# Patient Record
Sex: MALE | Race: WHITE | NOT HISPANIC OR LATINO | URBAN - METROPOLITAN AREA
[De-identification: names, ages, dates, MRNs, and addresses within clinical notes are randomized per-mention and may not be internally consistent; named-entity substitution may affect disease eponyms.]

---

## 2017-10-19 ENCOUNTER — OUTPATIENT (OUTPATIENT)
Dept: OUTPATIENT SERVICES | Facility: HOSPITAL | Age: 47
LOS: 1 days | End: 2017-10-19
Payer: COMMERCIAL

## 2017-10-19 ENCOUNTER — APPOINTMENT (OUTPATIENT)
Dept: MRI IMAGING | Facility: HOSPITAL | Age: 47
End: 2017-10-19
Payer: COMMERCIAL

## 2017-10-19 DIAGNOSIS — M51.26 OTHER INTERVERTEBRAL DISC DISPLACEMENT, LUMBAR REGION: ICD-10-CM

## 2017-10-19 DIAGNOSIS — M43.17 SPONDYLOLISTHESIS, LUMBOSACRAL REGION: ICD-10-CM

## 2017-10-19 DIAGNOSIS — Z00.8 ENCOUNTER FOR OTHER GENERAL EXAMINATION: ICD-10-CM

## 2017-10-19 PROCEDURE — 72148 MRI LUMBAR SPINE W/O DYE: CPT

## 2017-10-19 PROCEDURE — 72148 MRI LUMBAR SPINE W/O DYE: CPT | Mod: 26

## 2017-10-23 ENCOUNTER — APPOINTMENT (OUTPATIENT)
Dept: ORTHOPEDIC SURGERY | Facility: CLINIC | Age: 47
End: 2017-10-23
Payer: COMMERCIAL

## 2017-10-23 VITALS — BODY MASS INDEX: 26.88 KG/M2 | WEIGHT: 192 LBS | HEIGHT: 71 IN

## 2017-10-23 VITALS — HEART RATE: 63 BPM | DIASTOLIC BLOOD PRESSURE: 101 MMHG | SYSTOLIC BLOOD PRESSURE: 136 MMHG

## 2017-10-23 DIAGNOSIS — M48.07 SPINAL STENOSIS, LUMBOSACRAL REGION: ICD-10-CM

## 2017-10-23 PROCEDURE — 99203 OFFICE O/P NEW LOW 30 MIN: CPT

## 2018-01-03 ENCOUNTER — APPOINTMENT (OUTPATIENT)
Dept: ORTHOPEDIC SURGERY | Facility: CLINIC | Age: 48
End: 2018-01-03

## 2018-07-23 ENCOUNTER — TRANSCRIPTION ENCOUNTER (OUTPATIENT)
Age: 48
End: 2018-07-23

## 2018-07-24 ENCOUNTER — EMERGENCY (EMERGENCY)
Facility: HOSPITAL | Age: 48
LOS: 1 days | Discharge: HOME | End: 2018-07-24
Attending: EMERGENCY MEDICINE | Admitting: EMERGENCY MEDICINE
Payer: COMMERCIAL

## 2018-07-24 VITALS
OXYGEN SATURATION: 99 % | HEART RATE: 96 BPM | HEIGHT: 71 IN | RESPIRATION RATE: 18 BRPM | SYSTOLIC BLOOD PRESSURE: 157 MMHG | WEIGHT: 192.02 LBS | DIASTOLIC BLOOD PRESSURE: 100 MMHG | TEMPERATURE: 98 F

## 2018-07-24 VITALS
OXYGEN SATURATION: 99 % | DIASTOLIC BLOOD PRESSURE: 67 MMHG | RESPIRATION RATE: 16 BRPM | SYSTOLIC BLOOD PRESSURE: 130 MMHG | HEART RATE: 80 BPM

## 2018-07-24 PROCEDURE — 14040 TIS TRNFR F/C/C/M/N/A/G/H/F: CPT

## 2018-07-24 PROCEDURE — 99285 EMERGENCY DEPT VISIT HI MDM: CPT | Mod: 25

## 2018-07-24 PROCEDURE — 73140 X-RAY EXAM OF FINGER(S): CPT | Mod: 26,LT

## 2018-07-24 PROCEDURE — 73140 X-RAY EXAM OF FINGER(S): CPT

## 2018-07-24 PROCEDURE — 90471 IMMUNIZATION ADMIN: CPT

## 2018-07-24 PROCEDURE — 73140 X-RAY EXAM OF FINGER(S): CPT | Mod: 26,77,LT

## 2018-07-24 PROCEDURE — 99283 EMERGENCY DEPT VISIT LOW MDM: CPT

## 2018-07-24 PROCEDURE — 11740 EVACUATION SUBUNGUAL HMTMA: CPT | Mod: T3

## 2018-07-24 PROCEDURE — 90715 TDAP VACCINE 7 YRS/> IM: CPT

## 2018-07-24 RX ORDER — TETANUS TOXOID, REDUCED DIPHTHERIA TOXOID AND ACELLULAR PERTUSSIS VACCINE, ADSORBED 5; 2.5; 8; 8; 2.5 [IU]/.5ML; [IU]/.5ML; UG/.5ML; UG/.5ML; UG/.5ML
0.5 SUSPENSION INTRAMUSCULAR ONCE
Qty: 0 | Refills: 0 | Status: COMPLETED | OUTPATIENT
Start: 2018-07-24 | End: 2018-07-24

## 2018-07-24 RX ORDER — MORPHINE SULFATE 50 MG/1
4 CAPSULE, EXTENDED RELEASE ORAL ONCE
Qty: 0 | Refills: 0 | Status: DISCONTINUED | OUTPATIENT
Start: 2018-07-24 | End: 2018-07-24

## 2018-07-24 RX ORDER — OXYCODONE AND ACETAMINOPHEN 5; 325 MG/1; MG/1
1 TABLET ORAL ONCE
Qty: 0 | Refills: 0 | Status: DISCONTINUED | OUTPATIENT
Start: 2018-07-24 | End: 2018-07-24

## 2018-07-24 RX ADMIN — OXYCODONE AND ACETAMINOPHEN 1 TABLET(S): 5; 325 TABLET ORAL at 17:49

## 2018-07-24 RX ADMIN — Medication 1 TABLET(S): at 20:32

## 2018-07-24 RX ADMIN — OXYCODONE AND ACETAMINOPHEN 1 TABLET(S): 5; 325 TABLET ORAL at 21:33

## 2018-07-24 RX ADMIN — TETANUS TOXOID, REDUCED DIPHTHERIA TOXOID AND ACELLULAR PERTUSSIS VACCINE, ADSORBED 0.5 MILLILITER(S): 5; 2.5; 8; 8; 2.5 SUSPENSION INTRAMUSCULAR at 17:49

## 2018-07-24 RX ADMIN — OXYCODONE AND ACETAMINOPHEN 1 TABLET(S): 5; 325 TABLET ORAL at 19:00

## 2018-07-24 NOTE — ED PROVIDER NOTE - PHYSICAL EXAMINATION
left hand- 4th finger- crush injury, decrease ROM due to pain, positive radial pulse, less than 2 sec cap refill, nail exposed left hand- 4th finger- crush injury, decrease ROM due to pain, positive radial pulse, less than 2 sec cap refill, nail exposed  Nail deformity with avulsion of eponychial fold, subungal hematoma, ulnar and radial paronychial lacerations deep to subcutaneous layer with necrotic tissue.  1.2cm laceration of ulnar distal phalanx deep to subcutaneous layer with bleeding. Soft compartments.

## 2018-07-24 NOTE — ED PROVIDER NOTE - ATTENDING CONTRIBUTION TO CARE
Pt seen and examined with Gosia Reese and agree with H and P and Plan in its entirety without exeption  caught finger in deer stand while practicing for hunting season when stand started falling; left ring finger got caught between tree and stand causing crush injury  plan  plastics to see  tetanus  antibiotics=augmentin  reassess  dispo with followup

## 2018-07-24 NOTE — CONSULT NOTE ADULT - ASSESSMENT
A/P:48 y/o with nail avulsion and open fracture s/p repair.   - LUE elevation  - Pain control  - Augmentin  - Tetanus  - Maintain splint  - F/U 2 days  - Patient educated on warning signs to prompt ER return and need for possible pin fixation of fracture.     Thank You  David Benito MD  Plastic Surgery  [phone]

## 2018-07-24 NOTE — ED ADULT NURSE NOTE - OBJECTIVE STATEMENT
Pt presents with injury to fourth digit og left hand; states fell out of tree while installing hunting tree stand.

## 2018-07-24 NOTE — ED PROVIDER NOTE - OBJECTIVE STATEMENT
47 yr old male with no pmhx presents c/o left ring finger laceration, crush injury. 47 yr old male with no pmhx presents c/o left ring finger laceration, crush injury. Pt states finger stuck in deer stand right before arrival. Unknown last tetanus. Right hand dominant.

## 2018-07-24 NOTE — ED PROVIDER NOTE - PROGRESS NOTE DETAILS
hand called for consult. xray showing fracture.  finger reduced. Pt to follow up with hand surgeon- Dr. Benito as directed. Pt will be discharged with augmentin and percocet. Pt agrees with plan.

## 2018-07-24 NOTE — ED PROVIDER NOTE - MEDICAL DECISION MAKING DETAILS
47 yr old male with no pmhx presents c/o left ring finger laceration, crush injury. Pt states finger stuck in deer stand right before arrival. Unknown last tetanus. Right hand dominant.: r/o open finger fracture, pain control, xray, hand consult

## 2018-07-24 NOTE — CONSULT NOTE ADULT - SUBJECTIVE AND OBJECTIVE BOX
ODILON CONNORS  [id number]  [facility]    47yMale, RHD, presents with avulsion to the left ring finger after having fingers crushed in a tree device while hunting.  Patient reports bleeding and pain.  Patient denies weakness or numbness in the hand.  Patient denies other injuries.    PMHx/PSHx:  No pertinent past medical history  No significant past surgical history    amoxicillin  875 milliGRAM(s)/clavulanate 1 Tablet(s) Oral Once      Avelox (Unknown)      T(C): 36.4 (07-24-18 @ 16:37), Max: 36.4 (07-24-18 @ 16:37)  HR: 96 (07-24-18 @ 16:37) (96 - 96)  BP: 157/100 (07-24-18 @ 16:37) (157/100 - 157/100)  RR: 18 (07-24-18 @ 16:37) (18 - 18)  SpO2: 99% (07-24-18 @ 16:37) (99% - 99%)  NAD  Left Ring Finger:  Nail deformity with avulsion of eponychial fold, subungal hematoma, ulnar and radial paronychial lacerations deep to subcutaneous layer with necrotic tissue.  1.2cm laceration of ulnar distal phalanx deep to subcutaneous layer with bleeding.  +FDS/+FDP/+Extension in DIP/PIP/MCP joints in the digit.  Cap refill <3s.  +UDN/+RDN in all digits.  Soft compartments.      Xray:   PROCEDURE DATE:  07/24/2018        INTERPRETATION:      Radiographs of the left fourth finger         CLINICAL INFORMATION:  Crush injury    TECHNIQUE:  Frontal, oblique and lateral views of the finger were   obtained.    FINDINGS:   No prior examinations are available for review.    The phalanges are remarkable for a comminuted displaced fracture of the   tuft of the fourth finger. Distal tuft is displaced laterally and   volarly. The metacarpals are intact. There is no malalignment. . No   radiopaque foreign body is appreciated.     IMPRESSION: Displaced comminuted fracture of the tuft of the fourth   finger of the left hand      Procedure:  Left ring finger digital block / dorsal radial block.  Washout of wound with betadine.  Removal of nail plate, evacuation of subungal hematoma.  Excisional debridement of matrix to bone.  Matrix undermined and repaired with 5.0 vicryl.  Excisional debridement of eponychial fold skin to dermis.  Excisional debridement skin to subcutaneous layer of ulnar and radial paronychial lacerations.  Skin flaps undermined, advanced, repaired with 5.0 nylon.  Washout of volar wound.  Excisional debridement skin to subcutaneous layer.  Dissection and ligation of ulnar neurovascular bundle.  Skin flaps undermined, advanced, repaired with 5.0 nylon.  Nail plate sutured with 5.0 nylon suture as eponychial stent. Antibotic dressing applied with splint.

## 2018-07-26 ENCOUNTER — OUTPATIENT (OUTPATIENT)
Dept: OUTPATIENT SERVICES | Facility: HOSPITAL | Age: 48
LOS: 1 days | End: 2018-07-26
Payer: COMMERCIAL

## 2018-07-26 VITALS
RESPIRATION RATE: 16 BRPM | WEIGHT: 190.04 LBS | HEART RATE: 60 BPM | TEMPERATURE: 98 F | DIASTOLIC BLOOD PRESSURE: 89 MMHG | HEIGHT: 71 IN | SYSTOLIC BLOOD PRESSURE: 122 MMHG

## 2018-07-26 DIAGNOSIS — S62.635A DISPLACED FRACTURE OF DISTAL PHALANX OF LEFT RING FINGER, INITIAL ENCOUNTER FOR CLOSED FRACTURE: ICD-10-CM

## 2018-07-26 DIAGNOSIS — Z98.890 OTHER SPECIFIED POSTPROCEDURAL STATES: Chronic | ICD-10-CM

## 2018-07-26 DIAGNOSIS — S67.195A CRUSHING INJURY OF LEFT RING FINGER, INITIAL ENCOUNTER: ICD-10-CM

## 2018-07-26 DIAGNOSIS — Z01.818 ENCOUNTER FOR OTHER PREPROCEDURAL EXAMINATION: ICD-10-CM

## 2018-07-26 LAB
HCT VFR BLD CALC: 49.8 % — SIGNIFICANT CHANGE UP (ref 39–50)
HGB BLD-MCNC: 16.6 G/DL — SIGNIFICANT CHANGE UP (ref 13–17)
MCHC RBC-ENTMCNC: 29.7 PG — SIGNIFICANT CHANGE UP (ref 27–34)
MCHC RBC-ENTMCNC: 33.3 GM/DL — SIGNIFICANT CHANGE UP (ref 32–36)
MCV RBC AUTO: 89.1 FL — SIGNIFICANT CHANGE UP (ref 80–100)
NRBC # BLD: 0 /100 WBCS — SIGNIFICANT CHANGE UP (ref 0–0)
PLATELET # BLD AUTO: 221 K/UL — SIGNIFICANT CHANGE UP (ref 150–400)
RBC # BLD: 5.59 M/UL — SIGNIFICANT CHANGE UP (ref 4.2–5.8)
RBC # FLD: 12.8 % — SIGNIFICANT CHANGE UP (ref 10.3–14.5)
WBC # BLD: 7.37 K/UL — SIGNIFICANT CHANGE UP (ref 3.8–10.5)
WBC # FLD AUTO: 7.37 K/UL — SIGNIFICANT CHANGE UP (ref 3.8–10.5)

## 2018-07-26 PROCEDURE — G0463: CPT

## 2018-07-26 PROCEDURE — 85027 COMPLETE CBC AUTOMATED: CPT

## 2018-07-26 NOTE — H&P PST ADULT - HISTORY OF PRESENT ILLNESS
48 y/o healthy male with c/o of injury to left hand while practicing hunting deer. It happened on 7/24/18. Came to the ER in [facility], xray done, has fracture of the tip of the ring finger on left hand. Sutured the skin, however need surgery to repair the bone. Today in PST for pre op testing.

## 2018-07-26 NOTE — H&P PST ADULT - ASSESSMENT
46 y/o healthy male with c/o of injury to left hand while practicing hunting deer. It happened on 7/24/18. Came to the ER in [facility], xray done, has fracture of the tip of the ring finger on left hand. Sutured the skin, however need surgery to repair the bone. Today in PST for pre op testing.

## 2018-07-26 NOTE — H&P PST ADULT - NSANTHOSAYNRD_GEN_A_CORE
No. SELENE screening performed.  STOP BANG Legend: 0-2 = LOW Risk; 3-4 = INTERMEDIATE Risk; 5-8 = HIGH Risk

## 2018-07-31 ENCOUNTER — TRANSCRIPTION ENCOUNTER (OUTPATIENT)
Age: 48
End: 2018-07-31

## 2018-08-01 ENCOUNTER — OUTPATIENT (OUTPATIENT)
Dept: OUTPATIENT SERVICES | Facility: HOSPITAL | Age: 48
LOS: 1 days | End: 2018-08-01
Payer: COMMERCIAL

## 2018-08-01 VITALS
HEIGHT: 71 IN | OXYGEN SATURATION: 99 % | HEART RATE: 62 BPM | SYSTOLIC BLOOD PRESSURE: 135 MMHG | RESPIRATION RATE: 14 BRPM | TEMPERATURE: 98 F | WEIGHT: 192.02 LBS | DIASTOLIC BLOOD PRESSURE: 91 MMHG

## 2018-08-01 VITALS
DIASTOLIC BLOOD PRESSURE: 88 MMHG | SYSTOLIC BLOOD PRESSURE: 132 MMHG | HEART RATE: 52 BPM | OXYGEN SATURATION: 100 % | RESPIRATION RATE: 14 BRPM

## 2018-08-01 DIAGNOSIS — S62.635A DISPLACED FRACTURE OF DISTAL PHALANX OF LEFT RING FINGER, INITIAL ENCOUNTER FOR CLOSED FRACTURE: ICD-10-CM

## 2018-08-01 DIAGNOSIS — Z98.890 OTHER SPECIFIED POSTPROCEDURAL STATES: Chronic | ICD-10-CM

## 2018-08-01 DIAGNOSIS — S67.195A CRUSHING INJURY OF LEFT RING FINGER, INITIAL ENCOUNTER: ICD-10-CM

## 2018-08-01 PROCEDURE — 26756 PIN FINGER FRACTURE EACH: CPT | Mod: F3

## 2018-08-01 PROCEDURE — C1713: CPT

## 2018-08-01 PROCEDURE — 76000 FLUOROSCOPY <1 HR PHYS/QHP: CPT

## 2018-08-01 RX ORDER — HYDROMORPHONE HYDROCHLORIDE 2 MG/ML
0.5 INJECTION INTRAMUSCULAR; INTRAVENOUS; SUBCUTANEOUS
Qty: 0 | Refills: 0 | Status: DISCONTINUED | OUTPATIENT
Start: 2018-08-01 | End: 2018-08-01

## 2018-08-01 RX ORDER — ONDANSETRON 8 MG/1
4 TABLET, FILM COATED ORAL ONCE
Qty: 0 | Refills: 0 | Status: DISCONTINUED | OUTPATIENT
Start: 2018-08-01 | End: 2018-08-01

## 2018-08-01 RX ORDER — SODIUM CHLORIDE 9 MG/ML
1000 INJECTION, SOLUTION INTRAVENOUS
Qty: 0 | Refills: 0 | Status: DISCONTINUED | OUTPATIENT
Start: 2018-08-01 | End: 2018-08-01

## 2018-08-01 RX ORDER — CEFAZOLIN SODIUM 1 G
2000 VIAL (EA) INJECTION ONCE
Qty: 0 | Refills: 0 | Status: COMPLETED | OUTPATIENT
Start: 2018-08-01 | End: 2018-08-01

## 2018-08-01 RX ADMIN — SODIUM CHLORIDE 50 MILLILITER(S): 9 INJECTION, SOLUTION INTRAVENOUS at 08:38

## 2018-08-01 RX ADMIN — SODIUM CHLORIDE 100 MILLILITER(S): 9 INJECTION, SOLUTION INTRAVENOUS at 10:35

## 2018-08-01 NOTE — ASU DISCHARGE PLAN (ADULT/PEDIATRIC). - MEDICATION SUMMARY - MEDICATIONS TO TAKE
I will START or STAY ON the medications listed below when I get home from the hospital:    Percocet 5/325 oral tablet  -- 1 tab(s) by mouth every 6 hours, As Needed -for severe pain MDD:4  -- Caution federal law prohibits the transfer of this drug to any person other  than the person for whom it was prescribed.  May cause drowsiness.  Alcohol may intensify this effect.  Use care when operating dangerous machinery.  This prescription cannot be refilled.  This product contains acetaminophen.  Do not use  with any other product containing acetaminophen to prevent possible liver damage.  Using more of this medication than prescribed may cause serious breathing problems.    -- Indication: For pain medication-new rx sent to pharmacy    Augmentin 875 mg-125 mg oral tablet  --  by mouth every 12 hours- finish prescription  -- Indication: For antibiotics-finish current prescription I will START or STAY ON the medications listed below when I get home from the hospital:    Vicodin 5 mg-300 mg oral tablet  -- 1 tab(s) by mouth every 6 hours, As Needed -for severe pain MDD:4   -- Caution federal law prohibits the transfer of this drug to any person other  than the person for whom it was prescribed.  Do not drink alcoholic beverages when taking this medication.  May cause drowsiness.  Alcohol may intensify this effect.  Use care when operating dangerous machinery.  This drug may impair the ability to drive or operate machinery.  Use care until you become familiar with its effects.  This product contains acetaminophen.  Do not use  with any other product containing acetaminophen to prevent possible liver damage.  Using more of this medication than prescribed may cause serious breathing problems.    -- Indication: For pain medication    Augmentin 875 mg-125 mg oral tablet  --  by mouth every 12 hours- finish prescription  -- Indication: For antibiotics-finish current prescription

## 2018-08-01 NOTE — ASU DISCHARGE PLAN (ADULT/PEDIATRIC). - NOTIFY
Bleeding that does not stop/Numbness, color, or temperature change to extremity/Swelling that continues/Pain not relieved by Medications/Fever greater than 101/Numbness, tingling

## 2018-08-01 NOTE — ASU DISCHARGE PLAN (ADULT/PEDIATRIC). - MEDICATION SUMMARY - MEDICATIONS TO STOP TAKING
I will STOP taking the medications listed below when I get home from the hospital:  None I will STOP taking the medications listed below when I get home from the hospital:    Percocet 5/325 oral tablet  -- 1 tab(s) by mouth every 6 hours MDD:4 tablets PRN moderate to severe pain  -- Caution federal law prohibits the transfer of this drug to any person other  than the person for whom it was prescribed.  May cause drowsiness.  Alcohol may intensify this effect.  Use care when operating dangerous machinery.  This prescription cannot be refilled.  This product contains acetaminophen.  Do not use  with any other product containing acetaminophen to prevent possible liver damage.  Using more of this medication than prescribed may cause serious breathing problems.

## 2018-08-01 NOTE — ASU DISCHARGE PLAN (ADULT/PEDIATRIC). - INSTRUCTIONS
Increase fluid intake while taking pain medication Next week, call for appointment Increase fluid intake while taking pain medication. Progress diet advancing as tolerated

## 2018-08-01 NOTE — BRIEF OPERATIVE NOTE - PROCEDURE
<<-----Click on this checkbox to enter Procedure Percutaneous pinning of finger of left hand  08/01/2018  ring finger, utilizing mini fluoro  Active  GSIEGEL

## 2018-08-01 NOTE — BRIEF OPERATIVE NOTE - PRE-OP DX
Open displaced fracture of distal phalanx of left ring finger, initial encounter  08/01/2018    Active  Allison Bustamante

## 2018-08-09 ENCOUNTER — APPOINTMENT (OUTPATIENT)
Dept: FAMILY MEDICINE | Facility: CLINIC | Age: 48
End: 2018-08-09
Payer: COMMERCIAL

## 2018-08-09 ENCOUNTER — NON-APPOINTMENT (OUTPATIENT)
Age: 48
End: 2018-08-09

## 2018-08-09 VITALS
HEIGHT: 71 IN | WEIGHT: 193 LBS | SYSTOLIC BLOOD PRESSURE: 138 MMHG | DIASTOLIC BLOOD PRESSURE: 97 MMHG | HEART RATE: 67 BPM | BODY MASS INDEX: 27.02 KG/M2

## 2018-08-09 DIAGNOSIS — R94.31 ABNORMAL ELECTROCARDIOGRAM [ECG] [EKG]: ICD-10-CM

## 2018-08-09 DIAGNOSIS — R00.1 BRADYCARDIA, UNSPECIFIED: ICD-10-CM

## 2018-08-09 PROCEDURE — 99204 OFFICE O/P NEW MOD 45 MIN: CPT

## 2018-08-13 ENCOUNTER — OUTPATIENT (OUTPATIENT)
Dept: OUTPATIENT SERVICES | Facility: HOSPITAL | Age: 48
LOS: 1 days | End: 2018-08-13
Payer: COMMERCIAL

## 2018-08-13 DIAGNOSIS — Z98.890 OTHER SPECIFIED POSTPROCEDURAL STATES: Chronic | ICD-10-CM

## 2018-08-13 DIAGNOSIS — R03.0 ELEVATED BLOOD-PRESSURE READING, WITHOUT DIAGNOSIS OF HYPERTENSION: ICD-10-CM

## 2018-08-13 PROCEDURE — 93010 ELECTROCARDIOGRAM REPORT: CPT

## 2018-08-13 PROCEDURE — 93005 ELECTROCARDIOGRAM TRACING: CPT

## 2018-08-14 LAB
APPEARANCE: CLEAR
BILIRUBIN URINE: NEGATIVE
BLOOD URINE: NEGATIVE
COLOR: YELLOW
GLUCOSE QUALITATIVE U: NEGATIVE MG/DL
KETONES URINE: NEGATIVE
LEUKOCYTE ESTERASE URINE: NEGATIVE
NITRITE URINE: NEGATIVE
PH URINE: 5.5
PROTEIN URINE: NEGATIVE MG/DL
SPECIFIC GRAVITY URINE: 1.03
UROBILINOGEN URINE: NEGATIVE MG/DL

## 2018-08-16 LAB
ALBUMIN SERPL ELPH-MCNC: 4.7 G/DL
ALP BLD-CCNC: 63 U/L
ALT SERPL-CCNC: 59 U/L
ANION GAP SERPL CALC-SCNC: 12 MMOL/L
AST SERPL-CCNC: 35 U/L
BASOPHILS # BLD AUTO: 0.05 K/UL
BASOPHILS NFR BLD AUTO: 0.7 %
BILIRUB SERPL-MCNC: 0.4 MG/DL
BUN SERPL-MCNC: 21 MG/DL
CALCIUM SERPL-MCNC: 9.7 MG/DL
CHLORIDE SERPL-SCNC: 104 MMOL/L
CHOLEST SERPL-MCNC: 203 MG/DL
CHOLEST/HDLC SERPL: 5.2 RATIO
CO2 SERPL-SCNC: 26 MMOL/L
CREAT SERPL-MCNC: 1.32 MG/DL
EOSINOPHIL # BLD AUTO: 0.19 K/UL
EOSINOPHIL NFR BLD AUTO: 2.6 %
GLUCOSE SERPL-MCNC: 101 MG/DL
HCT VFR BLD CALC: 49.4 %
HDLC SERPL-MCNC: 39 MG/DL
HGB BLD-MCNC: 16.2 G/DL
IMM GRANULOCYTES NFR BLD AUTO: 0.1 %
LDLC SERPL CALC-MCNC: 126 MG/DL
LYMPHOCYTES # BLD AUTO: 2.2 K/UL
LYMPHOCYTES NFR BLD AUTO: 29.7 %
MAN DIFF?: NORMAL
MCHC RBC-ENTMCNC: 29 PG
MCHC RBC-ENTMCNC: 32.8 GM/DL
MCV RBC AUTO: 88.4 FL
MONOCYTES # BLD AUTO: 0.65 K/UL
MONOCYTES NFR BLD AUTO: 8.8 %
NEUTROPHILS # BLD AUTO: 4.31 K/UL
NEUTROPHILS NFR BLD AUTO: 58.1 %
PLATELET # BLD AUTO: 252 K/UL
POTASSIUM SERPL-SCNC: 5 MMOL/L
PROT SERPL-MCNC: 7 G/DL
RBC # BLD: 5.59 M/UL
RBC # FLD: 13.1 %
SODIUM SERPL-SCNC: 142 MMOL/L
TRIGL SERPL-MCNC: 191 MG/DL
TSH SERPL-ACNC: 2.27 UIU/ML
WBC # FLD AUTO: 7.41 K/UL

## 2018-08-16 NOTE — ASSESSMENT
[FreeTextEntry1] :  he has elevated blood pressure reading also is having some pain in the right side of his neck since his injury to his left hand. He has been taking Advil for this we will send him for some laboratory work his EKG is reported as abnormal due to poor R-wave progression he has no cardiac symptoms but we will order and echocardiogram he'll be revisited in approximately one to 2 weeks for blood pressure recheck with regard to the neck pain he has been advised to not take any nonsteroidals as they may elevate his blood pressure and to use warm soaks and Tylenol 4 times a day

## 2018-08-16 NOTE — HISTORY OF PRESENT ILLNESS
[FreeTextEntry8] : The patient is here because he is concerned about his blood pressure he recently had a hip crush injury to his left hand and blood pressure was high at that time he also has had some elevated readings in the past but has never been treated for hypertension. Otherwise he is healthy individual takes no medications has no allergies he smokes 3 cigarettes per week and drinks socially review of systems is totally unremarkable

## 2018-08-16 NOTE — PHYSICAL EXAM
[No Acute Distress] : no acute distress [Well Nourished] : well nourished [Well Developed] : well developed [Well-Appearing] : well-appearing [Normal Sclera/Conjunctiva] : normal sclera/conjunctiva [PERRL] : pupils equal round and reactive to light [Normal Outer Ear/Nose] : the outer ears and nose were normal in appearance [Normal Oropharynx] : the oropharynx was normal [Normal TMs] : both tympanic membranes were normal [No JVD] : no jugular venous distention [Supple] : supple [No Lymphadenopathy] : no lymphadenopathy [Thyroid Normal, No Nodules] : the thyroid was normal and there were no nodules present [No Respiratory Distress] : no respiratory distress  [Clear to Auscultation] : lungs were clear to auscultation bilaterally [No Accessory Muscle Use] : no accessory muscle use [Normal Percussion] : the chest was normal to percussion [Normal Rate] : normal rate  [Regular Rhythm] : with a regular rhythm [Normal S1, S2] : normal S1 and S2 [No Murmur] : no murmur heard [No Edema] : there was no peripheral edema [Soft] : abdomen soft [Non Tender] : non-tender [Non-distended] : non-distended [No Masses] : no abdominal mass palpated [No HSM] : no HSM [Normal Bowel Sounds] : normal bowel sounds [Normal Supraclavicular Nodes] : no supraclavicular lymphadenopathy [Normal Posterior Cervical Nodes] : no posterior cervical lymphadenopathy [Normal Anterior Cervical Nodes] : no anterior cervical lymphadenopathy [No CVA Tenderness] : no CVA  tenderness [No Spinal Tenderness] : no spinal tenderness [No Rash] : no rash [No Skin Lesions] : no skin lesions [Normal Gait] : normal gait [No Focal Deficits] : no focal deficits [de-identified] : Left hand In splint

## 2018-08-31 ENCOUNTER — APPOINTMENT (OUTPATIENT)
Dept: FAMILY MEDICINE | Facility: CLINIC | Age: 48
End: 2018-08-31

## 2018-09-26 ENCOUNTER — APPOINTMENT (OUTPATIENT)
Dept: FAMILY MEDICINE | Facility: CLINIC | Age: 48
End: 2018-09-26
Payer: COMMERCIAL

## 2018-09-26 VITALS
DIASTOLIC BLOOD PRESSURE: 104 MMHG | RESPIRATION RATE: 16 BRPM | SYSTOLIC BLOOD PRESSURE: 122 MMHG | OXYGEN SATURATION: 97 % | HEIGHT: 71 IN | WEIGHT: 193 LBS | HEART RATE: 77 BPM | BODY MASS INDEX: 27.02 KG/M2

## 2018-09-26 VITALS — SYSTOLIC BLOOD PRESSURE: 120 MMHG | DIASTOLIC BLOOD PRESSURE: 94 MMHG

## 2018-09-26 DIAGNOSIS — R03.0 ELEVATED BLOOD-PRESSURE READING, W/OUT DIAGNOSIS OF HYPERTENSION: ICD-10-CM

## 2018-09-26 PROCEDURE — 99213 OFFICE O/P EST LOW 20 MIN: CPT | Mod: 25

## 2018-09-26 PROCEDURE — 90686 IIV4 VACC NO PRSV 0.5 ML IM: CPT

## 2018-09-26 PROCEDURE — G0008: CPT

## 2018-09-26 NOTE — PHYSICAL EXAM
[No Acute Distress] : no acute distress [Well Nourished] : well nourished [Well Developed] : well developed [Well-Appearing] : well-appearing [Normal Sclera/Conjunctiva] : normal sclera/conjunctiva [PERRL] : pupils equal round and reactive to light [Normal Outer Ear/Nose] : the outer ears and nose were normal in appearance [Normal Oropharynx] : the oropharynx was normal [No JVD] : no jugular venous distention [Supple] : supple [No Respiratory Distress] : no respiratory distress  [Clear to Auscultation] : lungs were clear to auscultation bilaterally [Normal Rate] : normal rate  [Regular Rhythm] : with a regular rhythm [No Murmur] : no murmur heard [No CVA Tenderness] : no CVA  tenderness [No Spinal Tenderness] : no spinal tenderness [No Joint Swelling] : no joint swelling [Normal Gait] : normal gait [No Focal Deficits] : no focal deficits

## 2018-10-16 ENCOUNTER — APPOINTMENT (OUTPATIENT)
Dept: FAMILY MEDICINE | Facility: CLINIC | Age: 48
End: 2018-10-16
Payer: COMMERCIAL

## 2018-10-16 VITALS
DIASTOLIC BLOOD PRESSURE: 83 MMHG | BODY MASS INDEX: 26.6 KG/M2 | RESPIRATION RATE: 14 BRPM | OXYGEN SATURATION: 98 % | WEIGHT: 190 LBS | HEIGHT: 71 IN | HEART RATE: 67 BPM | SYSTOLIC BLOOD PRESSURE: 120 MMHG

## 2018-10-16 DIAGNOSIS — F41.0 PANIC DISORDER [EPISODIC PAROXYSMAL ANXIETY]: ICD-10-CM

## 2018-10-16 PROCEDURE — 99213 OFFICE O/P EST LOW 20 MIN: CPT

## 2018-10-16 NOTE — PHYSICAL EXAM
[No Acute Distress] : no acute distress [Well Nourished] : well nourished [Well Developed] : well developed [Normal Oropharynx] : the oropharynx was normal [No JVD] : no jugular venous distention [Supple] : supple [No Lymphadenopathy] : no lymphadenopathy [Thyroid Normal, No Nodules] : the thyroid was normal and there were no nodules present [No Respiratory Distress] : no respiratory distress  [Clear to Auscultation] : lungs were clear to auscultation bilaterally [No Accessory Muscle Use] : no accessory muscle use [Normal Rate] : normal rate  [Regular Rhythm] : with a regular rhythm [No Murmur] : no murmur heard [No Edema] : there was no peripheral edema [Soft] : abdomen soft [Non Tender] : non-tender [No HSM] : no HSM [Normal Bowel Sounds] : normal bowel sounds

## 2018-10-16 NOTE — HISTORY OF PRESENT ILLNESS
[FreeTextEntry1] : elevated blood pressure [de-identified] : The patient is here for blood pressure recheck of note is that blood work done earlier does reveal a creatinine of 1.32 slightly elevated and a blood sugar of 1.01 slightly elevated and the elevation of his ALT again very minimal and these will be repeated in the future he feels well review of systems is unremarkable he is not taking any nonsteroidals at this point and is weak but will be going to pain clinic for chronic radicular pain

## 2018-10-16 NOTE — PLAN
3 [FreeTextEntry1] : Patient will purchase a blood pressure monitor and record his blood pressure and keep a diary over 2 weeks his pressure changes greatly when he sits and relaxes need for medication is not yet conclusive we will repeat his laboratory work and followup with him in 2 weeks discussing his blood pressure diary we have spoke with the patient extensively about the nature of essential hypertension and educated him as to its importance 2

## 2018-10-16 NOTE — HISTORY OF PRESENT ILLNESS
[FreeTextEntry1] : hypertension [de-identified] : Patient is here today in followup on his hypertension of note is that this several days ago he had a panic attack and was admitted to [facility] he has had a panic attack one time in the past. He has checked his blood pressure multiple times over the last week or so and most of the readings have been elevated with systolics above 90 his evaluation and [facility] revealed a creatinine of 1.4 but otherwise was normal as was his cardiac evaluation he has had no symptoms since discharge. Review of systems otherwise unremarkable

## 2018-10-16 NOTE — PLAN
[FreeTextEntry1] : Patient with the hypertension we will start him out on amlodipine 5 mg daily we also will have him meet with our psychiatric social worker with regard to his recent panic attack and because of his elevated creatinine a renal sonogram will be done and a 24-hour urine for catecholamines will speak to him the followup p.r.n. 7-10 days

## 2018-10-16 NOTE — PLAN
[FreeTextEntry1] : Patient will purchase a blood pressure monitor and record his blood pressure and keep a diary over 2 weeks his pressure changes greatly when he sits and relaxes need for medication is not yet conclusive we will repeat his laboratory work and followup with him in 2 weeks discussing his blood pressure diary we have spoke with the patient extensively about the nature of essential hypertension and educated him as to its importance

## 2018-10-16 NOTE — REVIEW OF SYSTEMS
[Fever] : no fever [Chills] : no chills [Fatigue] : no fatigue [Night Sweats] : no night sweats [Sore Throat] : no sore throat [Palpitations] : no palpitations [Orthopena] : no orthopnea [Paroysmal Nocturnal Dyspnea] : no paroysmal nocturnal dyspnea [Shortness Of Breath] : no shortness of breath [Abdominal Pain] : no abdominal pain [Nausea] : no nausea [Vomiting] : no vomiting [Heartburn] : no heartburn [Dysuria] : no dysuria [Incontinence] : no incontinence [Hematuria] : no hematuria [Frequency] : no frequency [Joint Pain] : no joint pain [Headache] : no headache [Dizziness] : no dizziness [Fainting] : no fainting [Unsteady Walk] : no ataxia [Memory Loss] : no memory loss

## 2018-10-16 NOTE — HISTORY OF PRESENT ILLNESS
[FreeTextEntry1] : elevated blood pressure [de-identified] : The patient is here for blood pressure recheck of note is that blood work done earlier does reveal a creatinine of 1.32 slightly elevated and a blood sugar of 1.01 slightly elevated and the elevation of his ALT again very minimal and these will be repeated in the future he feels well review of systems is unremarkable he is not taking any nonsteroidals at this point and is weak but will be going to pain clinic for chronic radicular pain

## 2018-10-26 ENCOUNTER — APPOINTMENT (OUTPATIENT)
Dept: FAMILY MEDICINE | Facility: CLINIC | Age: 48
End: 2018-10-26

## 2018-11-02 ENCOUNTER — APPOINTMENT (OUTPATIENT)
Dept: FAMILY MEDICINE | Facility: CLINIC | Age: 48
End: 2018-11-02

## 2018-11-05 ENCOUNTER — APPOINTMENT (OUTPATIENT)
Dept: ULTRASOUND IMAGING | Facility: HOSPITAL | Age: 48
End: 2018-11-05
Payer: COMMERCIAL

## 2018-11-05 ENCOUNTER — OUTPATIENT (OUTPATIENT)
Dept: OUTPATIENT SERVICES | Facility: HOSPITAL | Age: 48
LOS: 1 days | End: 2018-11-05
Payer: COMMERCIAL

## 2018-11-05 DIAGNOSIS — Z00.8 ENCOUNTER FOR OTHER GENERAL EXAMINATION: ICD-10-CM

## 2018-11-05 DIAGNOSIS — Z98.890 OTHER SPECIFIED POSTPROCEDURAL STATES: Chronic | ICD-10-CM

## 2018-11-05 PROCEDURE — 76770 US EXAM ABDO BACK WALL COMP: CPT | Mod: 26

## 2018-11-05 PROCEDURE — 76770 US EXAM ABDO BACK WALL COMP: CPT

## 2018-11-14 ENCOUNTER — APPOINTMENT (OUTPATIENT)
Dept: FAMILY MEDICINE | Facility: CLINIC | Age: 48
End: 2018-11-14
Payer: COMMERCIAL

## 2018-11-14 VITALS
RESPIRATION RATE: 12 BRPM | OXYGEN SATURATION: 100 % | HEART RATE: 54 BPM | WEIGHT: 191.8 LBS | TEMPERATURE: 97.8 F | DIASTOLIC BLOOD PRESSURE: 94 MMHG | HEIGHT: 71 IN | BODY MASS INDEX: 26.85 KG/M2 | SYSTOLIC BLOOD PRESSURE: 142 MMHG

## 2018-11-14 VITALS — DIASTOLIC BLOOD PRESSURE: 82 MMHG | SYSTOLIC BLOOD PRESSURE: 132 MMHG

## 2018-11-14 PROCEDURE — 99213 OFFICE O/P EST LOW 20 MIN: CPT

## 2018-11-14 NOTE — PHYSICAL EXAM
[No Acute Distress] : no acute distress [Well Nourished] : well nourished [Normal Sclera/Conjunctiva] : normal sclera/conjunctiva [Normal Outer Ear/Nose] : the outer ears and nose were normal in appearance [Normal Oropharynx] : the oropharynx was normal [No JVD] : no jugular venous distention [Supple] : supple [No Respiratory Distress] : no respiratory distress  [Clear to Auscultation] : lungs were clear to auscultation bilaterally [Normal Rate] : normal rate  [Regular Rhythm] : with a regular rhythm [No Edema] : there was no peripheral edema [No CVA Tenderness] : no CVA  tenderness

## 2018-11-14 NOTE — HISTORY OF PRESENT ILLNESS
[FreeTextEntry1] : hypertension [de-identified] : The patient here for followup on hypertension he feels well he is taking amlodipine 5 mg daily we performed a renal sonogram which is reported to us is normal he has had no further panic attacks or anxiety issues review of systems is unremarkable

## 2018-11-14 NOTE — ASSESSMENT
[FreeTextEntry1] : Repeat blood pressure measurement reveals a pressure approximately 132/82 patient feels well examination is unremarkable we will continue him on amlodipine 5 mg daily and followup with a visit in about 3-4 weeks the patient has been asked to keep a diary of blood pressure readings at home and so far responses good.

## 2018-12-21 ENCOUNTER — APPOINTMENT (OUTPATIENT)
Dept: FAMILY MEDICINE | Facility: CLINIC | Age: 48
End: 2018-12-21
Payer: COMMERCIAL

## 2018-12-21 VITALS
HEART RATE: 85 BPM | BODY MASS INDEX: 27.16 KG/M2 | SYSTOLIC BLOOD PRESSURE: 116 MMHG | WEIGHT: 194 LBS | HEIGHT: 71 IN | RESPIRATION RATE: 16 BRPM | DIASTOLIC BLOOD PRESSURE: 84 MMHG | OXYGEN SATURATION: 97 %

## 2018-12-21 PROCEDURE — 99213 OFFICE O/P EST LOW 20 MIN: CPT

## 2018-12-21 NOTE — ASSESSMENT
[FreeTextEntry1] : The patient has been counseled to avoid salt to stay on his amlodipine and to continue to monitor his blood pressure he also has been then spoken to about colorectal cancer screening and advised that as of now the recommendations are for him to start at age 59 lungs he is asymptomatic. He does have a long history by that weaning many many years of having frequent bowel movements every day but no blood pain or other suspicious symptoms all appear in 4-6 weeks

## 2018-12-21 NOTE — HISTORY OF PRESENT ILLNESS
[FreeTextEntry1] : Hypertension [de-identified] : Patient is seen here today in followup on his hypertension he has been feeling well he states that his blood pressure when she checks daily he had for several weeks now it is usually in the 1:30 to 90 range. His review of systems is unremarkable no lightheadedness headache chest pain palpitations shortness of breath the patient also is concerned about when he should start screening for colon cancer and he has been told that H. 52 would be Procrit as he is at normal risk patient

## 2018-12-21 NOTE — PHYSICAL EXAM
[No Acute Distress] : no acute distress [Well Nourished] : well nourished [Well Developed] : well developed [Well-Appearing] : well-appearing [Normal Sclera/Conjunctiva] : normal sclera/conjunctiva [PERRL] : pupils equal round and reactive to light [Normal Outer Ear/Nose] : the outer ears and nose were normal in appearance [Normal Oropharynx] : the oropharynx was normal [Normal TMs] : both tympanic membranes were normal [No JVD] : no jugular venous distention [Supple] : supple [Thyroid Normal, No Nodules] : the thyroid was normal and there were no nodules present [No Respiratory Distress] : no respiratory distress  [Normal Rate] : normal rate  [Regular Rhythm] : with a regular rhythm [No Murmur] : no murmur heard [No Edema] : there was no peripheral edema [Soft] : abdomen soft [Non Tender] : non-tender [No HSM] : no HSM [No CVA Tenderness] : no CVA  tenderness [No Spinal Tenderness] : no spinal tenderness [No Rash] : no rash [Normal Gait] : normal gait [No Focal Deficits] : no focal deficits

## 2019-02-01 ENCOUNTER — MEDICATION RENEWAL (OUTPATIENT)
Age: 49
End: 2019-02-01

## 2019-02-06 ENCOUNTER — MEDICATION RENEWAL (OUTPATIENT)
Age: 49
End: 2019-02-06

## 2019-10-07 ENCOUNTER — APPOINTMENT (OUTPATIENT)
Dept: FAMILY MEDICINE | Facility: CLINIC | Age: 49
End: 2019-10-07
Payer: COMMERCIAL

## 2019-10-07 PROCEDURE — 90686 IIV4 VACC NO PRSV 0.5 ML IM: CPT

## 2019-10-07 PROCEDURE — G0008: CPT

## 2019-12-02 ENCOUNTER — APPOINTMENT (OUTPATIENT)
Dept: FAMILY MEDICINE | Facility: CLINIC | Age: 49
End: 2019-12-02

## 2020-07-20 ENCOUNTER — NON-APPOINTMENT (OUTPATIENT)
Age: 50
End: 2020-07-20

## 2020-07-20 ENCOUNTER — APPOINTMENT (OUTPATIENT)
Dept: FAMILY MEDICINE | Facility: CLINIC | Age: 50
End: 2020-07-20
Payer: COMMERCIAL

## 2020-07-20 VITALS
TEMPERATURE: 97.8 F | BODY MASS INDEX: 26.06 KG/M2 | OXYGEN SATURATION: 97 % | HEIGHT: 71 IN | RESPIRATION RATE: 16 BRPM | SYSTOLIC BLOOD PRESSURE: 110 MMHG | HEART RATE: 84 BPM | DIASTOLIC BLOOD PRESSURE: 72 MMHG | WEIGHT: 186.19 LBS

## 2020-07-20 PROCEDURE — 93000 ELECTROCARDIOGRAM COMPLETE: CPT

## 2020-07-20 PROCEDURE — 99396 PREV VISIT EST AGE 40-64: CPT | Mod: 25

## 2020-07-20 NOTE — HISTORY OF PRESENT ILLNESS
[FreeTextEntry1] : Health maintenance exam [de-identified] : The patient is here for health maintenance exam he feels well since his last visit although states that of late he has been down at times feeling that he doesn't really want to go at a house much. This lasts for several days and then he is currently back to normal his mouth looks of self-harm he still enjoys many things recently he took 30 large boat ride from [place] up here to the [place] area which he found very enjoyable no family history or personal history of depression he had does smoke about a pack a week he also has some symptoms for a long-standing period time of year we'll bowel syndrome with diarrhea predominance this is neither worsened nor abated he has never had a colonoscopy

## 2020-07-20 NOTE — ASSESSMENT
[FreeTextEntry1] : The patient seems to do recently well he'll be sent for routine laboratory work EKG today is unremarkable and we sent for colonoscopy because of his age of 49 and the continual bowel symptoms. He desires to stop smoking after laboratory work is and we will revisit with him with regard to combining any medication for smoking cessation and perhaps an cyclothymia or mild depression

## 2020-07-20 NOTE — HEALTH RISK ASSESSMENT
[Very Good] : ~his/her~ current health as very good [] : Yes [Yes] : Yes [2 - 4 times a month (2 pts)] : 2-4 times a month (2 points) [Less than monthly (1 pt)] : Less than monthly (1 point) [No] : In the past 12 months have you used drugs other than those required for medical reasons? No [No falls in past year] : Patient reported no falls in the past year [1] : 1) Little interest or pleasure doing things for several days (1) [0] : 2) Feeling down, depressed, or hopeless: Not at all (0) [de-identified] : One pack per week [de-identified] : Social occasional binge but only drinks once or twice per month [de-identified] : Varied diet [de-identified] : Active [[id number]] : 1

## 2020-07-20 NOTE — REVIEW OF SYSTEMS
[Abdominal Pain] : no abdominal pain [Nausea] : nausea [Constipation] : no constipation [Diarrhea] : diarrhea [Vomiting] : no vomiting [Heartburn] : no heartburn [Anxiety] : anxiety [Melena] : no melena [Negative] : Integumentary [de-identified] : [place]

## 2020-07-24 ENCOUNTER — APPOINTMENT (OUTPATIENT)
Dept: SURGERY | Facility: CLINIC | Age: 50
End: 2020-07-24
Payer: COMMERCIAL

## 2020-07-24 VITALS
BODY MASS INDEX: 26.04 KG/M2 | SYSTOLIC BLOOD PRESSURE: 122 MMHG | HEART RATE: 64 BPM | RESPIRATION RATE: 14 BRPM | TEMPERATURE: 98.4 F | DIASTOLIC BLOOD PRESSURE: 80 MMHG | WEIGHT: 186 LBS | OXYGEN SATURATION: 98 % | HEIGHT: 71 IN

## 2020-07-24 DIAGNOSIS — K59.00 CONSTIPATION, UNSPECIFIED: ICD-10-CM

## 2020-07-24 DIAGNOSIS — K21.9 GASTRO-ESOPHAGEAL REFLUX DISEASE W/OUT ESOPHAGITIS: ICD-10-CM

## 2020-07-24 PROCEDURE — 99204 OFFICE O/P NEW MOD 45 MIN: CPT

## 2020-08-03 LAB
ALBUMIN SERPL ELPH-MCNC: 5 G/DL
ALP BLD-CCNC: 67 U/L
ALT SERPL-CCNC: 36 U/L
ANION GAP SERPL CALC-SCNC: 13 MMOL/L
APPEARANCE: CLEAR
AST SERPL-CCNC: 24 U/L
BACTERIA: NEGATIVE
BASOPHILS # BLD AUTO: 0.06 K/UL
BASOPHILS NFR BLD AUTO: 0.8 %
BILIRUB SERPL-MCNC: 0.5 MG/DL
BILIRUBIN URINE: NEGATIVE
BLOOD URINE: NEGATIVE
BUN SERPL-MCNC: 18 MG/DL
CALCIUM SERPL-MCNC: 9.5 MG/DL
CHLORIDE SERPL-SCNC: 103 MMOL/L
CHOLEST SERPL-MCNC: 222 MG/DL
CHOLEST/HDLC SERPL: 5 RATIO
CO2 SERPL-SCNC: 26 MMOL/L
COLOR: YELLOW
CREAT SERPL-MCNC: 1.56 MG/DL
EOSINOPHIL # BLD AUTO: 0.14 K/UL
EOSINOPHIL NFR BLD AUTO: 1.9 %
ESTIMATED AVERAGE GLUCOSE: 111 MG/DL
GLUCOSE QUALITATIVE U: NEGATIVE
GLUCOSE SERPL-MCNC: 105 MG/DL
HBA1C MFR BLD HPLC: 5.5 %
HCT VFR BLD CALC: 48.8 %
HDLC SERPL-MCNC: 44 MG/DL
HGB BLD-MCNC: 15.7 G/DL
HYALINE CASTS: 1 /LPF
IMM GRANULOCYTES NFR BLD AUTO: 0.4 %
KETONES URINE: NEGATIVE
LDLC SERPL CALC-MCNC: 131 MG/DL
LEUKOCYTE ESTERASE URINE: NEGATIVE
LYMPHOCYTES # BLD AUTO: 2.09 K/UL
LYMPHOCYTES NFR BLD AUTO: 28.9 %
MAN DIFF?: NORMAL
MCHC RBC-ENTMCNC: 29.3 PG
MCHC RBC-ENTMCNC: 32.2 GM/DL
MCV RBC AUTO: 91.2 FL
MICROSCOPIC-UA: NORMAL
MONOCYTES # BLD AUTO: 0.65 K/UL
MONOCYTES NFR BLD AUTO: 9 %
NEUTROPHILS # BLD AUTO: 4.25 K/UL
NEUTROPHILS NFR BLD AUTO: 59 %
NITRITE URINE: NEGATIVE
PH URINE: 5.5
PLATELET # BLD AUTO: 274 K/UL
POTASSIUM SERPL-SCNC: 4.5 MMOL/L
PROT SERPL-MCNC: 7.2 G/DL
PROTEIN URINE: NORMAL
PSA SERPL-MCNC: 1.69 NG/ML
RBC # BLD: 5.35 M/UL
RBC # FLD: 12.7 %
RED BLOOD CELLS URINE: 2 /HPF
SODIUM SERPL-SCNC: 142 MMOL/L
SPECIFIC GRAVITY URINE: 1.03
SQUAMOUS EPITHELIAL CELLS: 1 /HPF
TRIGL SERPL-MCNC: 230 MG/DL
TSH SERPL-ACNC: 1.62 UIU/ML
UROBILINOGEN URINE: NORMAL
WBC # FLD AUTO: 7.22 K/UL
WHITE BLOOD CELLS URINE: 2 /HPF

## 2020-08-05 ENCOUNTER — APPOINTMENT (OUTPATIENT)
Dept: FAMILY MEDICINE | Facility: CLINIC | Age: 50
End: 2020-08-05
Payer: COMMERCIAL

## 2020-08-05 VITALS — SYSTOLIC BLOOD PRESSURE: 128 MMHG | DIASTOLIC BLOOD PRESSURE: 88 MMHG

## 2020-08-05 VITALS
TEMPERATURE: 98.2 F | OXYGEN SATURATION: 98 % | SYSTOLIC BLOOD PRESSURE: 140 MMHG | DIASTOLIC BLOOD PRESSURE: 90 MMHG | HEART RATE: 75 BPM | RESPIRATION RATE: 14 BRPM | HEIGHT: 71 IN | BODY MASS INDEX: 26.34 KG/M2 | WEIGHT: 188.13 LBS

## 2020-08-05 DIAGNOSIS — Z71.6 TOBACCO ABUSE COUNSELING: ICD-10-CM

## 2020-08-05 DIAGNOSIS — K58.9 IRRITABLE BOWEL SYNDROME W/OUT DIARRHEA: ICD-10-CM

## 2020-08-05 PROCEDURE — 99213 OFFICE O/P EST LOW 20 MIN: CPT

## 2020-08-05 NOTE — PLAN
[FreeTextEntry1] : \par HTN: well controlled with medication, continue as ordered\par \par IBS: pt seeing dr. kaplan, colonoscopy at the end of the month. Continue to monitor\par \par Elevated creatinine: will repeat renal panel in 2 weeks, f/u with in office visit in 3 weeks\par \par Smoking cessation: counseled pt on smoking cessation, will try chantix starting pack and f/u in 3 weeks. Counseled pt on possible side effects, continue to monitor

## 2020-08-05 NOTE — HISTORY OF PRESENT ILLNESS
[FreeTextEntry1] : pt here to review blood work [de-identified] : Pt is  in [place] and he is here for follow up for blood work.Pt reports sleeping well, and eating and drinking well. He is seeing dr. kaplan for colonoscopy scheduled on 8/18 for bathroom issues. Pt denies any SOB, cough, chest pain, palpitations, N/V/D/C, fever, chills, headache, dizziness, sore throat, nasal congestion, depression, or anxiety. No other health concerns today.

## 2020-08-05 NOTE — PHYSICAL EXAM
[Pedal Pulses Present] : the pedal pulses are present [Normal] : affect was normal and insight and judgment were intact [No Edema] : there was no peripheral edema

## 2020-08-13 NOTE — H&P PST ADULT - NSCAGESTDRUGANNOY_GEN_A_CORE_SD
Report signs and symptoms to primary care provider/Low salt diet/Monitor weight daily/Call primary care provider for follow up after discharge/Activities as tolerated no

## 2020-08-18 ENCOUNTER — APPOINTMENT (OUTPATIENT)
Dept: SURGERY | Facility: HOSPITAL | Age: 50
End: 2020-08-18

## 2020-08-21 ENCOUNTER — APPOINTMENT (OUTPATIENT)
Dept: FAMILY MEDICINE | Facility: CLINIC | Age: 50
End: 2020-08-21

## 2020-08-25 ENCOUNTER — TRANSCRIPTION ENCOUNTER (OUTPATIENT)
Age: 50
End: 2020-08-25

## 2020-08-26 ENCOUNTER — OUTPATIENT (OUTPATIENT)
Dept: OUTPATIENT SERVICES | Facility: HOSPITAL | Age: 50
LOS: 1 days | End: 2020-08-26
Payer: COMMERCIAL

## 2020-08-26 ENCOUNTER — APPOINTMENT (OUTPATIENT)
Dept: SURGERY | Facility: HOSPITAL | Age: 50
End: 2020-08-26

## 2020-08-26 DIAGNOSIS — K21.9 GASTRO-ESOPHAGEAL REFLUX DISEASE WITHOUT ESOPHAGITIS: ICD-10-CM

## 2020-08-26 DIAGNOSIS — K59.00 CONSTIPATION, UNSPECIFIED: ICD-10-CM

## 2020-08-26 DIAGNOSIS — Z98.890 OTHER SPECIFIED POSTPROCEDURAL STATES: Chronic | ICD-10-CM

## 2020-08-26 PROCEDURE — 43235 EGD DIAGNOSTIC BRUSH WASH: CPT

## 2020-08-26 PROCEDURE — G0121: CPT

## 2020-08-26 PROCEDURE — 45378 DIAGNOSTIC COLONOSCOPY: CPT

## 2020-08-26 RX ORDER — SODIUM CHLORIDE 9 MG/ML
1000 INJECTION INTRAMUSCULAR; INTRAVENOUS; SUBCUTANEOUS
Refills: 0 | Status: DISCONTINUED | OUTPATIENT
Start: 2020-08-26 | End: 2020-09-10

## 2020-08-26 RX ORDER — SODIUM CHLORIDE 9 MG/ML
1000 INJECTION, SOLUTION INTRAVENOUS ONCE
Refills: 0 | Status: DISCONTINUED | OUTPATIENT
Start: 2020-08-26 | End: 2020-08-26

## 2020-08-26 RX ADMIN — SODIUM CHLORIDE 75 MILLILITER(S): 9 INJECTION INTRAMUSCULAR; INTRAVENOUS; SUBCUTANEOUS at 09:24

## 2020-08-28 RX ORDER — VARENICLINE TARTRATE 0.5 (11)-1
0.5 MG X 11 & KIT ORAL
Qty: 1 | Refills: 0 | Status: DISCONTINUED | COMMUNITY
Start: 2020-08-05 | End: 2020-08-28

## 2020-08-28 RX ORDER — POLYETHYLENE GLYCOL 3350 AND ELECTROLYTES WITH LEMON FLAVOR 236; 22.74; 6.74; 5.86; 2.97 G/4L; G/4L; G/4L; G/4L; G/4L
236 POWDER, FOR SOLUTION ORAL
Qty: 1 | Refills: 0 | Status: DISCONTINUED | COMMUNITY
Start: 2020-07-24 | End: 2020-08-28

## 2020-08-28 RX ORDER — BISAC/NACL/NAHCO3/KCL/PEG 3350 5 MG-210 G
5-210 KIT ORAL
Qty: 1 | Refills: 0 | Status: DISCONTINUED | COMMUNITY
Start: 2020-08-18 | End: 2020-08-28

## 2020-09-02 ENCOUNTER — APPOINTMENT (OUTPATIENT)
Dept: SURGERY | Facility: HOSPITAL | Age: 50
End: 2020-09-02

## 2020-11-11 ENCOUNTER — APPOINTMENT (OUTPATIENT)
Dept: FAMILY MEDICINE | Facility: CLINIC | Age: 50
End: 2020-11-11
Payer: COMMERCIAL

## 2020-11-11 VITALS
WEIGHT: 189 LBS | HEIGHT: 71 IN | HEART RATE: 85 BPM | RESPIRATION RATE: 12 BRPM | BODY MASS INDEX: 26.46 KG/M2 | SYSTOLIC BLOOD PRESSURE: 144 MMHG | OXYGEN SATURATION: 99 % | DIASTOLIC BLOOD PRESSURE: 90 MMHG | TEMPERATURE: 97.7 F

## 2020-11-11 DIAGNOSIS — F41.9 ANXIETY DISORDER, UNSPECIFIED: ICD-10-CM

## 2020-11-11 DIAGNOSIS — R79.89 OTHER SPECIFIED ABNORMAL FINDINGS OF BLOOD CHEMISTRY: ICD-10-CM

## 2020-11-11 DIAGNOSIS — T14.8XXA OTHER INJURY OF UNSPECIFIED BODY REGION, INITIAL ENCOUNTER: ICD-10-CM

## 2020-11-11 PROCEDURE — 99213 OFFICE O/P EST LOW 20 MIN: CPT

## 2020-11-11 PROCEDURE — 99072 ADDL SUPL MATRL&STAF TM PHE: CPT

## 2020-11-11 RX ORDER — BUPROPION HYDROCHLORIDE 150 MG/1
150 TABLET, EXTENDED RELEASE ORAL
Qty: 60 | Refills: 1 | Status: DISCONTINUED | COMMUNITY
Start: 2020-08-28 | End: 2020-11-11

## 2020-11-11 RX ORDER — LIDOCAINE 5% 700 MG/1
5 PATCH TOPICAL
Qty: 30 | Refills: 2 | Status: DISCONTINUED | COMMUNITY
Start: 2017-10-23 | End: 2020-11-11

## 2020-11-11 NOTE — HISTORY OF PRESENT ILLNESS
[FreeTextEntry1] : follow up [de-identified] : Patient is here today because he would like work note. 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 2 teachers tested positive, he tested negative. However, his anxiety on the situation has been growing. He would like note to work from home. He reports blood pressure has increased with anxiety. Pt had been smoking 2 cigarettes per day, but is back up to 1/2 ppd because of stress. He was taking wellbutrin for smoking cessation but got bad nightmares and stopped. He says mother in law lives with him and she is elderly, smoker high risk [place] not want to infect her. He used to take xanax prn 0.25 mg. Pt denies any SOB, cough, chest pain, N/V/D/C, fever, chills, headache, dizziness, sore throat, or nasal congestion. He picked up gas can last friday and heard a pop in shoulder. He says he cannot lift, can drive, write, pull but is tender. No other health concerns today. He had colonoscopy done in august 2020 all normal.

## 2020-11-11 NOTE — PHYSICAL EXAM
[Normal Sclera/Conjunctiva] : normal sclera/conjunctiva [Normal] : no joint swelling and grossly normal strength and tone [Coordination Grossly Intact] : coordination grossly intact [Normal Gait] : normal gait [de-identified] : anxious

## 2020-11-11 NOTE — PLAN
[FreeTextEntry1] : \par Anxiety: will prescribe xanax for pt prn, istop checked. Patient also provided with work note, see chart to view. Continue to monitor status\par \par Tobacco use: patient still smoking, not ready to quit at this time d/t increased stress. Will continue to monitor and check back in at next visit\par \par Elevated creatinine: pt has not gone for renal panel yet, reprinted requisition for pt he will go to lab. Continue to monitor status\par \par HTN: continue medication as ordered, continue to monitor\par \par Muscle strain: biceps/triceps not ruptured, full ROM and neurovascular in tact. Advised patient tylenol and warm soaks, cont to monitor

## 2020-11-11 NOTE — HEALTH RISK ASSESSMENT
[Patient reported colonoscopy was normal] : Patient reported colonoscopy was normal [ColonoscopyDate] : 08/2020

## 2020-11-16 LAB
ALBUMIN SERPL ELPH-MCNC: 4.7 G/DL
ANION GAP SERPL CALC-SCNC: 14 MMOL/L
BUN SERPL-MCNC: 25 MG/DL
CALCIUM SERPL-MCNC: 9.5 MG/DL
CHLORIDE SERPL-SCNC: 102 MMOL/L
CO2 SERPL-SCNC: 26 MMOL/L
CREAT SERPL-MCNC: 1.38 MG/DL
GLUCOSE SERPL-MCNC: 66 MG/DL
PHOSPHATE SERPL-MCNC: 3.2 MG/DL
POTASSIUM SERPL-SCNC: 5.7 MMOL/L
SODIUM SERPL-SCNC: 142 MMOL/L

## 2020-11-25 LAB
ALBUMIN SERPL ELPH-MCNC: 4.7 G/DL
ANION GAP SERPL CALC-SCNC: 10 MMOL/L
BUN SERPL-MCNC: 20 MG/DL
CALCIUM SERPL-MCNC: 9.9 MG/DL
CHLORIDE SERPL-SCNC: 102 MMOL/L
CO2 SERPL-SCNC: 28 MMOL/L
CREAT SERPL-MCNC: 1.32 MG/DL
GLUCOSE SERPL-MCNC: 107 MG/DL
PHOSPHATE SERPL-MCNC: 3.6 MG/DL
POTASSIUM SERPL-SCNC: 5 MMOL/L
POTASSIUM SERPL-SCNC: 5 MMOL/L
SODIUM SERPL-SCNC: 140 MMOL/L

## 2021-02-02 ENCOUNTER — RX RENEWAL (OUTPATIENT)
Age: 51
End: 2021-02-02

## 2021-09-15 ENCOUNTER — NON-APPOINTMENT (OUTPATIENT)
Age: 51
End: 2021-09-15

## 2021-09-15 ENCOUNTER — APPOINTMENT (OUTPATIENT)
Dept: ORTHOPEDIC SURGERY | Facility: CLINIC | Age: 51
End: 2021-09-15

## 2021-09-22 ENCOUNTER — APPOINTMENT (OUTPATIENT)
Dept: ORTHOPEDIC SURGERY | Facility: CLINIC | Age: 51
End: 2021-09-22

## 2021-09-22 DIAGNOSIS — M54.16 RADICULOPATHY, LUMBAR REGION: ICD-10-CM

## 2021-09-29 ENCOUNTER — TRANSCRIPTION ENCOUNTER (OUTPATIENT)
Age: 51
End: 2021-09-29

## 2021-11-12 ENCOUNTER — APPOINTMENT (OUTPATIENT)
Dept: FAMILY MEDICINE | Facility: CLINIC | Age: 51
End: 2021-11-12

## 2021-11-15 ENCOUNTER — APPOINTMENT (OUTPATIENT)
Dept: FAMILY MEDICINE | Facility: CLINIC | Age: 51
End: 2021-11-15
Payer: COMMERCIAL

## 2021-11-15 ENCOUNTER — NON-APPOINTMENT (OUTPATIENT)
Age: 51
End: 2021-11-15

## 2021-11-15 VITALS
HEIGHT: 71 IN | TEMPERATURE: 98.9 F | DIASTOLIC BLOOD PRESSURE: 80 MMHG | HEART RATE: 77 BPM | OXYGEN SATURATION: 99 % | SYSTOLIC BLOOD PRESSURE: 112 MMHG | RESPIRATION RATE: 16 BRPM | WEIGHT: 187 LBS | BODY MASS INDEX: 26.18 KG/M2

## 2021-11-15 DIAGNOSIS — Q76.2 CONGENITAL SPONDYLOLISTHESIS: ICD-10-CM

## 2021-11-15 PROCEDURE — 99214 OFFICE O/P EST MOD 30 MIN: CPT | Mod: 25

## 2021-11-15 PROCEDURE — 93000 ELECTROCARDIOGRAM COMPLETE: CPT

## 2021-11-15 NOTE — PHYSICAL EXAM
[No Acute Distress] : no acute distress [Well Nourished] : well nourished [Well Developed] : well developed [Well-Appearing] : well-appearing [Normal Sclera/Conjunctiva] : normal sclera/conjunctiva [PERRL] : pupils equal round and reactive to light [Normal Oropharynx] : the oropharynx was normal [No JVD] : no jugular venous distention [No Lymphadenopathy] : no lymphadenopathy [No Respiratory Distress] : no respiratory distress  [No Accessory Muscle Use] : no accessory muscle use [Clear to Auscultation] : lungs were clear to auscultation bilaterally [Normal Rate] : normal rate  [Regular Rhythm] : with a regular rhythm [No Murmur] : no murmur heard [No Edema] : there was no peripheral edema [Soft] : abdomen soft [Non Tender] : non-tender [Non-distended] : non-distended [No HSM] : no HSM [Normal Bowel Sounds] : normal bowel sounds [No CVA Tenderness] : no CVA  tenderness [Normal Supraclavicular Nodes] : no supraclavicular lymphadenopathy [No Spinal Tenderness] : no spinal tenderness [Coordination Grossly Intact] : coordination grossly intact [No Focal Deficits] : no focal deficits [Normal Gait] : normal gait [Speech Grossly Normal] : speech grossly normal [Alert and Oriented x3] : oriented to person, place, and time [Normal Mood] : the mood was normal

## 2021-11-16 ENCOUNTER — MED ADMIN CHARGE (OUTPATIENT)
Age: 51
End: 2021-11-16

## 2021-11-17 LAB
ALBUMIN SERPL ELPH-MCNC: 4.9 G/DL
ALP BLD-CCNC: 68 U/L
ALT SERPL-CCNC: 53 U/L
ANION GAP SERPL CALC-SCNC: 13 MMOL/L
APPEARANCE: CLEAR
APTT BLD: 30.3 SEC
AST SERPL-CCNC: 23 U/L
BACTERIA: NEGATIVE
BASOPHILS # BLD AUTO: 0.03 K/UL
BASOPHILS NFR BLD AUTO: 0.4 %
BILIRUB SERPL-MCNC: 0.4 MG/DL
BILIRUBIN URINE: NEGATIVE
BLOOD URINE: NEGATIVE
BUN SERPL-MCNC: 17 MG/DL
CALCIUM SERPL-MCNC: 10.2 MG/DL
CHLORIDE SERPL-SCNC: 106 MMOL/L
CHOLEST SERPL-MCNC: 204 MG/DL
CO2 SERPL-SCNC: 25 MMOL/L
COLOR: NORMAL
CREAT SERPL-MCNC: 1.33 MG/DL
EOSINOPHIL # BLD AUTO: 0.07 K/UL
EOSINOPHIL NFR BLD AUTO: 0.9 %
ESTIMATED AVERAGE GLUCOSE: 114 MG/DL
GLUCOSE QUALITATIVE U: NEGATIVE
GLUCOSE SERPL-MCNC: 98 MG/DL
HBA1C MFR BLD HPLC: 5.6 %
HCT VFR BLD CALC: 50.9 %
HDLC SERPL-MCNC: 40 MG/DL
HGB BLD-MCNC: 16.7 G/DL
HYALINE CASTS: 1 /LPF
IMM GRANULOCYTES NFR BLD AUTO: 0.3 %
INR PPP: 0.94 RATIO
KETONES URINE: NEGATIVE
LDLC SERPL CALC-MCNC: 119 MG/DL
LEUKOCYTE ESTERASE URINE: NEGATIVE
LYMPHOCYTES # BLD AUTO: 1.48 K/UL
LYMPHOCYTES NFR BLD AUTO: 19.6 %
MAN DIFF?: NORMAL
MCHC RBC-ENTMCNC: 30 PG
MCHC RBC-ENTMCNC: 32.8 GM/DL
MCV RBC AUTO: 91.4 FL
MICROSCOPIC-UA: NORMAL
MONOCYTES # BLD AUTO: 0.61 K/UL
MONOCYTES NFR BLD AUTO: 8.1 %
NEUTROPHILS # BLD AUTO: 5.36 K/UL
NEUTROPHILS NFR BLD AUTO: 70.7 %
NITRITE URINE: NEGATIVE
NONHDLC SERPL-MCNC: 164 MG/DL
PH URINE: 5.5
PLATELET # BLD AUTO: 277 K/UL
POTASSIUM SERPL-SCNC: 5.2 MMOL/L
PROT SERPL-MCNC: 7.4 G/DL
PROTEIN URINE: NEGATIVE
PT BLD: 11.1 SEC
RBC # BLD: 5.57 M/UL
RBC # FLD: 12.5 %
RED BLOOD CELLS URINE: 0 /HPF
SODIUM SERPL-SCNC: 144 MMOL/L
SPECIFIC GRAVITY URINE: 1.02
SQUAMOUS EPITHELIAL CELLS: 0 /HPF
TRIGL SERPL-MCNC: 223 MG/DL
TSH SERPL-ACNC: 1.97 UIU/ML
UROBILINOGEN URINE: NORMAL
WBC # FLD AUTO: 7.57 K/UL
WHITE BLOOD CELLS URINE: 1 /HPF

## 2021-11-18 NOTE — ASSESSMENT
[High Risk Surgery - Intraperitoneal, Intrathoracic or Supringuinal Vascular Procedures] : High Risk Surgery - Intraperitoneal, Intrathoracic or Supringuinal Vascular Procedures - No (0) [Ischemic Heart Disease] : Ischemic Heart Disease - No (0) [Congestive Heart Failure] : Congestive Heart Failure - No (0) [Prior Cerebrovascular Accident or TIA] : Prior Cerebrovascular Accident or TIA - No (0) [Creatinine >= 2mg/dL (1 Point)] : Creatinine >= 2mg/dL - No (0) [Insulin-dependent Diabetic (1 Point)] : Insulin-dependent Diabetic - No (0) [0] : 0 , RCRI Class: I, Risk of Post-Op Cardiac Complications: 3.9%, 95% CI for Risk Estimate: 2.8% - 5.4% [Continue medications as is] : Continue current medications [As per surgery] : as per surgery [Patient Optimized for Surgery] : Patient optimized for surgery [No Further Testing Recommended] : no further testing recommended [FreeTextEntry4] : Not high risk surgery, low risk patient proceed with surgery

## 2021-11-18 NOTE — RESULTS/DATA
[] : not indicated [de-identified] : normal [de-identified] : normal [de-identified] : creatinine 1.33, ALT 53 inconsequential re surgery [de-identified] : normal

## 2021-11-18 NOTE — HISTORY OF PRESENT ILLNESS
[No Pertinent Cardiac History] : no history of aortic stenosis, atrial fibrillation, coronary artery disease, recent myocardial infarction, or implantable device/pacemaker [Smoker] : smoker [(Patient denies any chest pain, claudication, dyspnea on exertion, orthopnea, palpitations or syncope)] : Patient denies any chest pain, claudication, dyspnea on exertion, orthopnea, palpitations or syncope [Good (7-10 METs)] : Good (7-10 METs) [Aortic Stenosis] : no aortic stenosis [Atrial Fibrillation] : no atrial fibrillation [Coronary Artery Disease] : no coronary artery disease [Recent Myocardial Infarction] : no recent myocardial infarction [Implantable Device/Pacemaker] : no implantable device/pacemaker [Asthma] : no asthma [COPD] : no COPD [Sleep Apnea] : no sleep apnea [Family Member] : no family member with adverse anesthesia reaction/sudden death [Self] : no previous adverse anesthesia reaction [Chronic Anticoagulation] : no chronic anticoagulation [Chronic Kidney Disease] : no chronic kidney disease [Diabetes] : no diabetes [FreeTextEntry1] : Laminectomy and fusion [FreeTextEntry2] : Nov 30,2021 [FreeTextEntry3] : Dr Mazariegos [FreeTextEntry4] : Patient is here for preoperative evaluation prior to laminectomy and fusion he has severe spondylolisthesis which is causing him continual pain and difficulty patient has only had minor surgery previously which he tolerated well medications.  Medications include amlodipine 5 mg for hypertension and alprazolam 0.25 occasionally for anxiety patient has not smoked in a month previous to that he smoked only several cigarettes a day he is allergic to Avelox [FreeTextEntry7] : EKG on November 15, 2021 normal

## 2022-01-24 ENCOUNTER — RX RENEWAL (OUTPATIENT)
Age: 52
End: 2022-01-24

## 2022-01-28 ENCOUNTER — RX RENEWAL (OUTPATIENT)
Age: 52
End: 2022-01-28

## 2022-05-06 ENCOUNTER — APPOINTMENT (OUTPATIENT)
Dept: FAMILY MEDICINE | Facility: CLINIC | Age: 52
End: 2022-05-06

## 2022-10-20 ENCOUNTER — APPOINTMENT (OUTPATIENT)
Dept: FAMILY MEDICINE | Facility: CLINIC | Age: 52
End: 2022-10-20

## 2022-10-20 VITALS
HEART RATE: 73 BPM | OXYGEN SATURATION: 98 % | RESPIRATION RATE: 16 BRPM | WEIGHT: 187 LBS | DIASTOLIC BLOOD PRESSURE: 90 MMHG | TEMPERATURE: 98.4 F | HEIGHT: 71 IN | SYSTOLIC BLOOD PRESSURE: 140 MMHG | BODY MASS INDEX: 26.18 KG/M2

## 2022-10-20 VITALS — DIASTOLIC BLOOD PRESSURE: 90 MMHG | SYSTOLIC BLOOD PRESSURE: 138 MMHG

## 2022-10-20 DIAGNOSIS — Z23 ENCOUNTER FOR IMMUNIZATION: ICD-10-CM

## 2022-10-20 DIAGNOSIS — Z72.0 TOBACCO USE: ICD-10-CM

## 2022-10-20 DIAGNOSIS — Z12.5 ENCOUNTER FOR SCREENING FOR MALIGNANT NEOPLASM OF PROSTATE: ICD-10-CM

## 2022-10-20 DIAGNOSIS — M67.88 OTHER SPECIFIED DISORDERS OF SYNOVIUM AND TENDON, OTHER SITE: ICD-10-CM

## 2022-10-20 DIAGNOSIS — R42 DIZZINESS AND GIDDINESS: ICD-10-CM

## 2022-10-20 PROCEDURE — 99396 PREV VISIT EST AGE 40-64: CPT | Mod: 25

## 2022-10-20 PROCEDURE — 90686 IIV4 VACC NO PRSV 0.5 ML IM: CPT

## 2022-10-20 PROCEDURE — G0008: CPT

## 2022-10-21 PROBLEM — Z23 FLU VACCINE NEED: Status: ACTIVE | Noted: 2019-10-07

## 2022-10-21 PROBLEM — M67.88 ACHILLES TENDONOSIS: Status: ACTIVE | Noted: 2022-10-20

## 2022-10-21 PROBLEM — R42 DIZZINESS: Status: ACTIVE | Noted: 2022-10-20

## 2022-10-21 PROBLEM — Z72.0 CURRENT TOBACCO USE: Status: RESOLVED | Noted: 2020-11-11 | Resolved: 2022-10-21

## 2022-10-21 NOTE — HISTORY OF PRESENT ILLNESS
[FreeTextEntry1] : CPE  [de-identified] : 52 yo M PMH HTN, anxiety, IBS former smoker (quit 10/2021), laminectomy/fusion presenting today for CPE.  \par In terms of specialists, he follows w/ ortho.  \par He has a few concerns today. \par States that he stopped his amlodipine 1 week ago as he though could be related to symptoms of dizziness he was feeling.  \par For the past 3-4 months he had intermittent episodes of dizziness, that last about 10 seconds,  his last episode was a few weeks ago. Denies association with position, headache, changes in vision, weakness, ear pain, or any other neurological symptoms.  \par He also mentions notices a mass on on his R Achilles tendon about 8 months, denies weakness, does feel some irritation when place pressure on his foot when walking.  \par He would also like a refill on his xanax, which he uses rarely.

## 2022-10-21 NOTE — ASSESSMENT
[FreeTextEntry1] : 50 yo M PMH HTN, anxiety, IBS former smoker (quit 10/2021), laminectomy/fusion presenting today for CPE.

## 2022-10-21 NOTE — PLAN
[FreeTextEntry1] : #HCM \par -discussed medical history \par -lab work script provided \par -depression screen negative \par -vaccinations: influenza vaccine given during today's encounter, received COVID vaccine, up to date w/ TDAP\par -up to date w/ colonoscopy\par -Achilles tendinopathy: discussed supportive shoe, NSAIDs pending CMP results on blood work, f/u US, discussed sports medicine followup pending US results \par \par #Dizziness \par - f/u blood work including CBC/Iron studies/TSH/CMP\par -he will restart his amlodipine as BP elevated at today's visit elevated, he will monitor to see if dizziness returns when restarting medication \par -if blood work wnl, discussed neuro referral  \par  \par \par -f/u in 6 months \par \par

## 2022-10-21 NOTE — PHYSICAL EXAM
[Coordination Grossly Intact] : coordination grossly intact [No Focal Deficits] : no focal deficits [Normal Gait] : normal gait [Normal] : affect was normal and insight and judgment were intact [de-identified] : R Achilles area of swelling, slight tenderness to palpation, ROM of R ankle in tact

## 2022-10-21 NOTE — HEALTH RISK ASSESSMENT
[Former] : Former [2 - 4 times a month (2 pts)] : 2-4 times a month (2 points) [5 or 6 (2 pts)] : 5 or 6 (2  points) [Yes] : In the past 12 months have you used drugs other than those required for medical reasons? Yes [0] : 2) Feeling down, depressed, or hopeless: Not at all (0) [PHQ-2 Negative - No further assessment needed] : PHQ-2 Negative - No further assessment needed [HIV test declined] : HIV test declined [Hepatitis C test declined] : Hepatitis C test declined [With Significant Other] : lives with significant other [Employed] : employed [] :  [Fully functional (bathing, dressing, toileting, transferring, walking, feeding)] : Fully functional (bathing, dressing, toileting, transferring, walking, feeding) [Fully functional (using the telephone, shopping, preparing meals, housekeeping, doing laundry, using] : Fully functional and needs no help or supervision to perform IADLs (using the telephone, shopping, preparing meals, housekeeping, doing laundry, using transportation, managing medications and managing finances) [de-identified] : 10/2021 [Audit-CScore] : 4 [de-identified] : marijuana daily  [[id number]] : 0 [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

## 2022-10-25 ENCOUNTER — TRANSCRIPTION ENCOUNTER (OUTPATIENT)
Age: 52
End: 2022-10-25

## 2022-10-25 LAB
ALBUMIN SERPL ELPH-MCNC: 4.8 G/DL
ALP BLD-CCNC: 65 U/L
ALT SERPL-CCNC: 29 U/L
ANION GAP SERPL CALC-SCNC: 13 MMOL/L
AST SERPL-CCNC: 19 U/L
BASOPHILS # BLD AUTO: 0.05 K/UL
BASOPHILS NFR BLD AUTO: 0.7 %
BILIRUB SERPL-MCNC: 0.6 MG/DL
BUN SERPL-MCNC: 23 MG/DL
CALCIUM SERPL-MCNC: 9.6 MG/DL
CHLORIDE SERPL-SCNC: 103 MMOL/L
CHOLEST SERPL-MCNC: 189 MG/DL
CO2 SERPL-SCNC: 26 MMOL/L
CREAT SERPL-MCNC: 1.31 MG/DL
EGFR: 67 ML/MIN/1.73M2
EOSINOPHIL # BLD AUTO: 0.07 K/UL
EOSINOPHIL NFR BLD AUTO: 0.9 %
ESTIMATED AVERAGE GLUCOSE: 120 MG/DL
FERRITIN SERPL-MCNC: 174 NG/ML
FOLATE SERPL-MCNC: 19.4 NG/ML
GLUCOSE SERPL-MCNC: 86 MG/DL
HBA1C MFR BLD HPLC: 5.8 %
HCT VFR BLD CALC: 45 %
HDLC SERPL-MCNC: 43 MG/DL
HGB BLD-MCNC: 14.7 G/DL
IMM GRANULOCYTES NFR BLD AUTO: 0.3 %
IRON SATN MFR SERPL: 13 %
IRON SERPL-MCNC: 57 UG/DL
LDLC SERPL CALC-MCNC: 122 MG/DL
LYMPHOCYTES # BLD AUTO: 1.58 K/UL
LYMPHOCYTES NFR BLD AUTO: 21.2 %
MAN DIFF?: NORMAL
MCHC RBC-ENTMCNC: 29.2 PG
MCHC RBC-ENTMCNC: 32.7 GM/DL
MCV RBC AUTO: 89.3 FL
MONOCYTES # BLD AUTO: 0.7 K/UL
MONOCYTES NFR BLD AUTO: 9.4 %
NEUTROPHILS # BLD AUTO: 5.02 K/UL
NEUTROPHILS NFR BLD AUTO: 67.5 %
NONHDLC SERPL-MCNC: 145 MG/DL
PLATELET # BLD AUTO: 244 K/UL
POTASSIUM SERPL-SCNC: 4.6 MMOL/L
PROT SERPL-MCNC: 7.2 G/DL
PSA FREE FLD-MCNC: 26 %
PSA FREE SERPL-MCNC: 0.34 NG/ML
PSA SERPL-MCNC: 1.31 NG/ML
RBC # BLD: 5.04 M/UL
RBC # BLD: 5.04 M/UL
RBC # FLD: 13.2 %
RETICS # AUTO: 1.8 %
RETICS AGGREG/RBC NFR: 91.7 K/UL
SODIUM SERPL-SCNC: 142 MMOL/L
TIBC SERPL-MCNC: 432 UG/DL
TRIGL SERPL-MCNC: 119 MG/DL
TSH SERPL-ACNC: 2.04 UIU/ML
UIBC SERPL-MCNC: 375 UG/DL
VIT B12 SERPL-MCNC: 492 PG/ML
WBC # FLD AUTO: 7.44 K/UL

## 2022-10-26 ENCOUNTER — TRANSCRIPTION ENCOUNTER (OUTPATIENT)
Age: 52
End: 2022-10-26

## 2022-11-01 ENCOUNTER — APPOINTMENT (OUTPATIENT)
Dept: ULTRASOUND IMAGING | Facility: CLINIC | Age: 52
End: 2022-11-01

## 2022-11-15 ENCOUNTER — OUTPATIENT (OUTPATIENT)
Dept: OUTPATIENT SERVICES | Facility: HOSPITAL | Age: 52
LOS: 1 days | End: 2022-11-15
Payer: COMMERCIAL

## 2022-11-15 ENCOUNTER — APPOINTMENT (OUTPATIENT)
Dept: ULTRASOUND IMAGING | Facility: CLINIC | Age: 52
End: 2022-11-15

## 2022-11-15 DIAGNOSIS — Z98.890 OTHER SPECIFIED POSTPROCEDURAL STATES: Chronic | ICD-10-CM

## 2022-11-15 DIAGNOSIS — M67.88 OTHER SPECIFIED DISORDERS OF SYNOVIUM AND TENDON, OTHER SITE: ICD-10-CM

## 2022-11-15 PROCEDURE — 76882 US LMTD JT/FCL EVL NVASC XTR: CPT

## 2022-11-15 PROCEDURE — 76882 US LMTD JT/FCL EVL NVASC XTR: CPT | Mod: 26,RT

## 2022-12-01 ENCOUNTER — APPOINTMENT (OUTPATIENT)
Dept: ORTHOPEDIC SURGERY | Facility: CLINIC | Age: 52
End: 2022-12-01

## 2022-12-01 DIAGNOSIS — I10 ESSENTIAL (PRIMARY) HYPERTENSION: ICD-10-CM

## 2022-12-01 DIAGNOSIS — M76.61 ACHILLES TENDINITIS, RIGHT LEG: ICD-10-CM

## 2022-12-01 PROCEDURE — 99214 OFFICE O/P EST MOD 30 MIN: CPT

## 2022-12-01 RX ORDER — MELOXICAM 15 MG/1
15 TABLET ORAL DAILY
Qty: 30 | Refills: 1 | Status: COMPLETED | COMMUNITY
Start: 2022-12-01 | End: 1900-01-01

## 2022-12-01 NOTE — PHYSICAL EXAM
[Right] : right foot and ankle [NL (40)] : plantar flexion 40 degrees [NL 30)] : inversion 30 degrees [5___] : [facility] 5[unfilled]/5 [2+] : posterior tibialis pulse: 2+ [Normal] : saphenous nerve sensation normal [] : non-antalgic [FreeTextEntry3] : Thickening achilles watershed.  [de-identified] : eversion 15 degrees [TWNoteComboBox7] : dorsiflexion 5 degrees

## 2022-12-01 NOTE — ASSESSMENT
Patient position semi-fowlers with abdomen exposed for paracentesis. Patient prepped and draped per unit standard.    Safety straps applied:N/A   [FreeTextEntry1] : MRI RT ankle to evaluate for achilles tendon tear.\par \par Ice to affected area.\par WBAT in supportive footwear, heel lifts.\par No high impact activities. \par \par The patient was explained the options as well as benefits of over the counter verses prescription strength nonsteroidal anti-inflammatory medication. The patient opts for a prescription strength medication.\par \par Progress Note entered by Manuel Grider PA-C working as a scribe for Dr. Ramos.\par Patient seen by Manuel Grider PA-C under the supervision of Dr. Ramos.\par

## 2022-12-01 NOTE — HISTORY OF PRESENT ILLNESS
[7] : 7 [6] : 6 [Dull/Aching] : dull/aching [Localized] : localized [Intermittent] : intermittent [Household chores] : household chores [Leisure] : leisure [Work] : work [Social interactions] : social interactions [Rest] : rest [Sitting] : sitting [Standing] : standing [Walking] : walking [Stairs] : stairs [de-identified] : Pt is a 51 year old M who presents today for evaluation of their right ankle. He reports pain and enlarging bump on his right achilles for over a year. Had a sonogram done which showed no evidence of a tear but severe achilles tendinosis present. Denies trauma/previous injury. Feels very tight. No numbness/tingling. Tylenol PRN. No formal treatment to date. WB in sneakers.  [] : Post Surgical Visit: no [FreeTextEntry1] : R ankle

## 2022-12-07 ENCOUNTER — FORM ENCOUNTER (OUTPATIENT)
Age: 52
End: 2022-12-07

## 2022-12-08 ENCOUNTER — APPOINTMENT (OUTPATIENT)
Dept: MRI IMAGING | Facility: CLINIC | Age: 52
End: 2022-12-08

## 2022-12-08 PROCEDURE — 73721 MRI JNT OF LWR EXTRE W/O DYE: CPT | Mod: RT

## 2022-12-15 ENCOUNTER — APPOINTMENT (OUTPATIENT)
Dept: ORTHOPEDIC SURGERY | Facility: CLINIC | Age: 52
End: 2022-12-15

## 2022-12-19 ENCOUNTER — NON-APPOINTMENT (OUTPATIENT)
Age: 52
End: 2022-12-19

## 2023-01-23 ENCOUNTER — RX RENEWAL (OUTPATIENT)
Age: 53
End: 2023-01-23

## 2023-04-05 ENCOUNTER — APPOINTMENT (OUTPATIENT)
Dept: FAMILY MEDICINE | Facility: CLINIC | Age: 53
End: 2023-04-05
Payer: COMMERCIAL

## 2023-04-05 VITALS
WEIGHT: 187 LBS | OXYGEN SATURATION: 99 % | SYSTOLIC BLOOD PRESSURE: 126 MMHG | DIASTOLIC BLOOD PRESSURE: 88 MMHG | RESPIRATION RATE: 16 BRPM | HEIGHT: 71 IN | TEMPERATURE: 98.6 F | HEART RATE: 64 BPM | BODY MASS INDEX: 26.18 KG/M2

## 2023-04-05 DIAGNOSIS — I10 ESSENTIAL (PRIMARY) HYPERTENSION: ICD-10-CM

## 2023-04-05 PROCEDURE — 99214 OFFICE O/P EST MOD 30 MIN: CPT

## 2023-04-05 NOTE — ASSESSMENT
[FreeTextEntry1] : 51 yo M PMH HTN, anxiety, IBS former smoker (quit 10/2021), laminectomy/fusion prediabetes presenting today for HTN.

## 2023-04-05 NOTE — PHYSICAL EXAM
[Normal] : affect was normal and insight and judgment were intact
I will STOP taking the medications listed below when I get home from the hospital:  None

## 2023-04-05 NOTE — HISTORY OF PRESENT ILLNESS
[FreeTextEntry1] : HTN  [de-identified] : 51 yo M PMH HTN, anxiety, IBS former smoker (quit 10/2021), laminectomy/fusion prediabetes presenting today for HTN. \par BP has been controlled on amlodipine, at home on average 120s/80s. \par \par

## 2023-04-05 NOTE — PLAN
[FreeTextEntry1] : #HTN \par -controlled\par -continue amlodipine 5 mg QD \par -follow-up in 6 months for CPE \par \par #Anxiety \par -uses xanax sparingly\par -ISTOP reviewed, medication refilled, continue xanax .25 mg QD PRN \par \par -follow-up in October for CPE

## 2023-06-26 ENCOUNTER — EMERGENCY (EMERGENCY)
Facility: HOSPITAL | Age: 53
LOS: 1 days | Discharge: HOME | End: 2023-06-26
Attending: EMERGENCY MEDICINE
Payer: COMMERCIAL

## 2023-06-26 VITALS
HEART RATE: 60 BPM | OXYGEN SATURATION: 98 % | DIASTOLIC BLOOD PRESSURE: 79 MMHG | TEMPERATURE: 98 F | RESPIRATION RATE: 18 BRPM | SYSTOLIC BLOOD PRESSURE: 143 MMHG

## 2023-06-26 VITALS
SYSTOLIC BLOOD PRESSURE: 141 MMHG | TEMPERATURE: 98 F | DIASTOLIC BLOOD PRESSURE: 103 MMHG | OXYGEN SATURATION: 97 % | HEART RATE: 73 BPM | WEIGHT: 184.97 LBS | HEIGHT: 71 IN | RESPIRATION RATE: 16 BRPM

## 2023-06-26 DIAGNOSIS — Z98.890 OTHER SPECIFIED POSTPROCEDURAL STATES: Chronic | ICD-10-CM

## 2023-06-26 LAB
ALBUMIN SERPL ELPH-MCNC: 4.5 G/DL — SIGNIFICANT CHANGE UP (ref 3.3–5)
ALP SERPL-CCNC: 74 U/L — SIGNIFICANT CHANGE UP (ref 40–120)
ALT FLD-CCNC: 31 U/L — SIGNIFICANT CHANGE UP (ref 10–45)
ANION GAP SERPL CALC-SCNC: 10 MMOL/L — SIGNIFICANT CHANGE UP (ref 5–17)
AST SERPL-CCNC: 18 U/L — SIGNIFICANT CHANGE UP (ref 10–40)
BASOPHILS # BLD AUTO: 0.04 K/UL — SIGNIFICANT CHANGE UP (ref 0–0.2)
BASOPHILS NFR BLD AUTO: 0.4 % — SIGNIFICANT CHANGE UP (ref 0–2)
BILIRUB SERPL-MCNC: 0.8 MG/DL — SIGNIFICANT CHANGE UP (ref 0.2–1.2)
BUN SERPL-MCNC: 18 MG/DL — SIGNIFICANT CHANGE UP (ref 7–23)
CALCIUM SERPL-MCNC: 9.5 MG/DL — SIGNIFICANT CHANGE UP (ref 8.4–10.5)
CHLORIDE SERPL-SCNC: 101 MMOL/L — SIGNIFICANT CHANGE UP (ref 96–108)
CO2 SERPL-SCNC: 28 MMOL/L — SIGNIFICANT CHANGE UP (ref 22–31)
CREAT SERPL-MCNC: 1.32 MG/DL — HIGH (ref 0.5–1.3)
EGFR: 66 ML/MIN/1.73M2 — SIGNIFICANT CHANGE UP
EOSINOPHIL # BLD AUTO: 0.07 K/UL — SIGNIFICANT CHANGE UP (ref 0–0.5)
EOSINOPHIL NFR BLD AUTO: 0.6 % — SIGNIFICANT CHANGE UP (ref 0–6)
GLUCOSE SERPL-MCNC: 120 MG/DL — HIGH (ref 70–99)
HCT VFR BLD CALC: 47.6 % — SIGNIFICANT CHANGE UP (ref 39–50)
HGB BLD-MCNC: 15.7 G/DL — SIGNIFICANT CHANGE UP (ref 13–17)
IMM GRANULOCYTES NFR BLD AUTO: 0.3 % — SIGNIFICANT CHANGE UP (ref 0–0.9)
LYMPHOCYTES # BLD AUTO: 18.8 % — SIGNIFICANT CHANGE UP (ref 13–44)
LYMPHOCYTES # BLD AUTO: 2.06 K/UL — SIGNIFICANT CHANGE UP (ref 1–3.3)
MCHC RBC-ENTMCNC: 29 PG — SIGNIFICANT CHANGE UP (ref 27–34)
MCHC RBC-ENTMCNC: 33 GM/DL — SIGNIFICANT CHANGE UP (ref 32–36)
MCV RBC AUTO: 87.8 FL — SIGNIFICANT CHANGE UP (ref 80–100)
MONOCYTES # BLD AUTO: 0.82 K/UL — SIGNIFICANT CHANGE UP (ref 0–0.9)
MONOCYTES NFR BLD AUTO: 7.5 % — SIGNIFICANT CHANGE UP (ref 2–14)
NEUTROPHILS # BLD AUTO: 7.94 K/UL — HIGH (ref 1.8–7.4)
NEUTROPHILS NFR BLD AUTO: 72.4 % — SIGNIFICANT CHANGE UP (ref 43–77)
NRBC # BLD: 0 /100 WBCS — SIGNIFICANT CHANGE UP (ref 0–0)
PLATELET # BLD AUTO: 277 K/UL — SIGNIFICANT CHANGE UP (ref 150–400)
POTASSIUM SERPL-MCNC: 4.1 MMOL/L — SIGNIFICANT CHANGE UP (ref 3.5–5.3)
POTASSIUM SERPL-SCNC: 4.1 MMOL/L — SIGNIFICANT CHANGE UP (ref 3.5–5.3)
PROT SERPL-MCNC: 7.3 G/DL — SIGNIFICANT CHANGE UP (ref 6–8.3)
RBC # BLD: 5.42 M/UL — SIGNIFICANT CHANGE UP (ref 4.2–5.8)
RBC # FLD: 13.4 % — SIGNIFICANT CHANGE UP (ref 10.3–14.5)
SODIUM SERPL-SCNC: 139 MMOL/L — SIGNIFICANT CHANGE UP (ref 135–145)
WBC # BLD: 10.96 K/UL — HIGH (ref 3.8–10.5)
WBC # FLD AUTO: 10.96 K/UL — HIGH (ref 3.8–10.5)

## 2023-06-26 PROCEDURE — 85025 COMPLETE CBC W/AUTO DIFF WBC: CPT

## 2023-06-26 PROCEDURE — 99284 EMERGENCY DEPT VISIT MOD MDM: CPT | Mod: 25

## 2023-06-26 PROCEDURE — 74177 CT ABD & PELVIS W/CONTRAST: CPT | Mod: 26,MA

## 2023-06-26 PROCEDURE — 99284 EMERGENCY DEPT VISIT MOD MDM: CPT

## 2023-06-26 PROCEDURE — 80053 COMPREHEN METABOLIC PANEL: CPT

## 2023-06-26 PROCEDURE — 96374 THER/PROPH/DIAG INJ IV PUSH: CPT | Mod: XU

## 2023-06-26 PROCEDURE — 74177 CT ABD & PELVIS W/CONTRAST: CPT | Mod: MA

## 2023-06-26 RX ORDER — ACETAMINOPHEN 500 MG
650 TABLET ORAL ONCE
Refills: 0 | Status: COMPLETED | OUTPATIENT
Start: 2023-06-26 | End: 2023-06-26

## 2023-06-26 RX ORDER — SODIUM CHLORIDE 9 MG/ML
1000 INJECTION INTRAMUSCULAR; INTRAVENOUS; SUBCUTANEOUS ONCE
Refills: 0 | Status: COMPLETED | OUTPATIENT
Start: 2023-06-26 | End: 2023-06-26

## 2023-06-26 RX ORDER — ONDANSETRON 8 MG/1
4 TABLET, FILM COATED ORAL ONCE
Refills: 0 | Status: COMPLETED | OUTPATIENT
Start: 2023-06-26 | End: 2023-06-26

## 2023-06-26 RX ADMIN — Medication 650 MILLIGRAM(S): at 07:55

## 2023-06-26 RX ADMIN — Medication 1 TABLET(S): at 11:21

## 2023-06-26 RX ADMIN — SODIUM CHLORIDE 1000 MILLILITER(S): 9 INJECTION INTRAMUSCULAR; INTRAVENOUS; SUBCUTANEOUS at 07:49

## 2023-06-26 RX ADMIN — ONDANSETRON 4 MILLIGRAM(S): 8 TABLET, FILM COATED ORAL at 07:49

## 2023-06-26 NOTE — ED ADULT NURSE NOTE - OBJECTIVE STATEMENT
Patient c/o abdominal pain and rectal bleeding x 2 days. As per patient he woke up on saturday night with severe abdominal cramping and had an episode of diarrhea. Patient thought it was gas pain but it started to feel more severe. Patient has since had multiple episodes of bright red bloody diarrhea absent of stool. Patient reports feeling nauseous but denies vomiting. Denies fever, chills, sob, chest pain, vomiting. Patient A&Ox4 and ambulatory, family at bedside. No signs of acute distress at this time. Plan of care in progress.

## 2023-06-26 NOTE — ED PROVIDER NOTE - PATIENT PORTAL LINK FT
You can access the FollowMyHealth Patient Portal offered by [facility] by registering at the following website: [email or web address]. By joining [employer]’s FollowMyHealth portal, you will also be able to view your health information using other applications (apps) compatible with our system.

## 2023-06-26 NOTE — ED PROCEDURE NOTE - CPROC ED INFORMED CONSENT1
Benefits, risks, and possible complications of procedure explained to patient/caregiver who verbalized understanding and gave verbal consent.
Benefits, risks, and possible complications of procedure explained to patient/caregiver who verbalized understanding and gave verbal consent.
large skin tear

## 2023-06-26 NOTE — ED PROVIDER NOTE - NSFOLLOWUPINSTRUCTIONS_ED_ALL_ED_FT
You were seen for multiple episodes of bloody diarrhea.  Your evaluated with physical exam, blood work, and CT abdomen/pelvis.  Your results showed inflammation/thickening of the descending colon and an elevated white blood cell count consistent with colitis (infection of large bowel).  You are treated with fluids, Tylenol, ondansetron, and Augmentin. Verbal instructions provided, including instructions to return to ED immediately for any new, worsening, or concerning symptoms. Patient and/or family/caregiver endorsed understanding.    Please take the following medications at home:   - Augmentin (amoxicillin 875 mg - clavulanate 125 mg) every 8 hours for 5 days   - Acetaminophen (Tylenol) 500 to 1000 mg every 6-8 hours as needed for pain   - Ibuprofen (Advil or Motrin) 400 to 600 mg every 6-8 hours as needed for pain, take with food   - Alternate acetaminophen and ibuprofen every 3 hours for optimal pain coverage    Please follow up with your gastroenterologist and/or primary care provider regarding this ED visit.    Thank you for choosing us for your care.

## 2023-06-26 NOTE — ED PROVIDER NOTE - OBJECTIVE STATEMENT
52-year-old male with history of hypertension on amlodipine and lumbar spinal surgery presents for several episodes of bright red blood with diarrhea and abdominal cramping sensation since late night Saturday (6/24).  He had a medium or steak at a friend's house on Saturday night prior to bowel movements.  He woke up with severe abdominal cramping and had 1 episode of diarrhea.  All subsequent abdominal cramping episodes were followed by bright red blood per rectum with no or small amount of stool.  He endorses decreased p.o. intake, nauseousness, headaches, lightheadedness, and chills with subjective fever. He endorses smoking marijuana and occasional alcohol use, but denies current smoking. Colonoscopy 1 yr was negative. He denies any CP, SOB, vomiting, or recent coughs/sore throat.

## 2023-06-26 NOTE — ED ADULT NURSE NOTE - NSFALLUNIVINTERV_ED_ALL_ED
Bed/Stretcher in lowest position, wheels locked, appropriate side rails in place/Call bell, personal items and telephone in reach/Instruct patient to call for assistance before getting out of bed/chair/stretcher/Non-slip footwear applied when patient is off stretcher/[place] to call system/Physically safe environment - no spills, clutter or unnecessary equipment/Purposeful proactive rounding/Room/bathroom lighting operational, light cord in reach

## 2023-06-26 NOTE — ED ADULT TRIAGE NOTE - INTERNATIONAL TRAVEL
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]   Procedure : Procedure(s):  Posterior thoracic 12 to Lumbar 1 or additional level for placement of spinal cord stimulator paddle and placement of implantable pulse generator/battery over right buttock     Past Medical History:   Diagnosis Date     CARDIAC DYSRHYTHMIAS NEC 10/3/2007    Taking atenelol for years for this     History of skin cancer      Mitral valve prolapse      Neurogenic bladder      Sacral decubitus ulcer, stage IV (H)      Thoracic spinal cord injury (H)       Past Surgical History:   Procedure Laterality Date     DECOMPRESSION LUMBAR ONE LEVEL N/A 2019    Procedure: Posterior spinal decompression;  Surgeon: Alf Ritchie MD;  Location: [facility] OR     IR SPINAL ANGIOGRAM  10/16/2019     IR SPINAL ANGIOGRAM  2019     LAPAROSCOPIC TUBAL LIGATION       REPAIR SPINAL ARERIOVENOUS MALFORMATION N/A 2019    Procedure: with surgical disconnection arterial venous fistula Thoracic 5;  Surgeon: Alf Ritchie MD;  Location: [facility] OR      Allergies   Allergen Reactions     Contrast Dye Rash     Painful rash after x-ray contrast      Social History     Tobacco Use     Smoking status: Never Smoker     Smokeless tobacco: Never Used   Substance Use Topics     Alcohol use: No      Wt Readings from Last 1 Encounters:   21 56.7 kg (125 lb)        Anesthesia Evaluation   Pt  has had prior anesthetic.     No history of anesthetic complications       ROS/MED HX  ENT/Pulmonary:  - neg pulmonary ROS  (-) tobacco use, asthma and sleep apnea   Neurologic: Comment: Thoracic spinal cord injury, progressive paralysis, wheelchair bound    (+) peripheral neuropathy, - LEs, consistent w/ spinal issues.  (-) no seizures and no CVA   Cardiovascular: Comment: Has been taking metoprolol ~ 40 yrs for mitral prolapse (no documentation of MVP on current echo)    (+) -----Previous cardiac testing   Echo: Date: 2017 Results:  CONCLUSION:  1. Technically difficult study with poor acoustic windows.     2. The left ventricle is normal size with hyperdynamic systolic function. Ejection fraction is 70-75%.     3. The right ventricle is poorly visualized but appears to be normal size with normal systolic function.     4. Mild tricuspid regurgitation.     5. No significant pericardial effusion.     6. No prior echos available for comparison.      Stress Test:  Date: Results:    ECG Reviewed:  Date: 2019 Results:  Sinus rhythm  Nonspecific T wave abnormality  Abnormal ECG  No previous ECGs available  Ventricular rate 75 bpm    Cath:  Date: 2017 Results:  CONCLUSION  1) Normal LV systolic function, ejection fraction of 50%.     2) Angiographically normal coronary arteries.       METS/Exercise Tolerance: 1 - Eating, dressing Comment: Wheelchair bound   Hematologic:  - neg hematologic  ROS  (-) history of blood clots and history of blood transfusion   Musculoskeletal: Comment: Hx progressive paralysis and spinal cord injury due to an unclear etiology but involving spinal cord tethering and development of cord injury involving the mid to lower thoracic spinal cord.  She suffers from significant pain.     Arteriovenous fistula of spinal cord vessels      GI/Hepatic: Comment: Occasional GERD    Has colostomy   (-) liver disease   Renal/Genitourinary: Comment: Has hudson catheter 2/2 neurogenic bladder   (-) renal disease    Endo:       Psychiatric/Substance Use: Comment: Taking MS Contin bid & percocet 4-6 tabs daily      (+) psychiatric history depression H/O chronic opiod use  (Morphine 15 mg PO q12, Percocet 5/325 q4.).     Infectious Disease:  - neg infectious disease ROS     Malignancy:  - neg malignancy ROS     Other: Comment: Hx decubitus ulcer requiring debridement w/ skin flap           Physical Exam    Airway        Mallampati: II   TM distance: > 3 FB   Neck ROM: limited   Mouth opening: < 3 cm    Respiratory Devices and Support         Dental  no notable dental history     (+) upper dentures and partials      Cardiovascular   cardiovascular exam normal          Pulmonary   pulmonary exam normal                OUTSIDE LABS:  CBC:   Lab Results   Component Value Date    WBC 6.2 12/31/2019    WBC 9.3 12/28/2019    HGB 10.6 (L) 12/31/2019    HGB 10.7 (L) 12/28/2019    HCT 32.7 (L) 12/31/2019    HCT 34.3 (L) 12/28/2019     (L) 12/31/2019     12/28/2019     BMP:   Lab Results   Component Value Date     12/31/2019     12/28/2019    POTASSIUM 3.7 12/31/2019    POTASSIUM 3.7 12/28/2019    CHLORIDE 110 (H) 12/31/2019    CHLORIDE 109 12/28/2019    CO2 32 12/31/2019    CO2 27 12/28/2019    BUN 10 12/31/2019    BUN 10 12/28/2019    CR 0.49 (L) 12/31/2019    CR 0.56 12/28/2019    GLC 86 12/31/2019    GLC 91 12/28/2019     COAGS:   Lab Results   Component Value Date    PTT 27 12/27/2019    INR 1.04 12/27/2019     POC:   Lab Results   Component Value Date    BGM 87 12/27/2019     HEPATIC: No results found for: ALBUMIN, PROTTOTAL, ALT, AST, GGT, ALKPHOS, BILITOTAL, BILIDIRECT, DAVID  OTHER:   Lab Results   Component Value Date    PH 7.43 12/27/2019    LACT 1.4 12/27/2019    A1C 5.3 11/17/2011    ADAN 8.0 (L) 12/31/2019    PHOS 3.5 12/27/2019    MAG 2.0 12/27/2019    TSH 2.33 08/07/2010    SED 4 11/17/2011       Anesthesia Plan    ASA Status:  3      Anesthesia Type: General.   Induction: Intravenous.    Maintenance: Balanced.   Techniques and Equipment:     - Airway: Video-Laryngoscope         Consents    Anesthesia Plan(s) and associated risks, benefits, and realistic alternatives discussed. Questions answered and patient/representative(s) expressed understanding.     - Discussed with:  Patient         Postoperative Care    Pain management: IV analgesics, Multi-modal analgesia, Oral pain medications.   PONV prophylaxis: Ondansetron (or other 5HT-3), Dexamethasone or Solumedrol     Comments:    Precedex GTT @ 0.2 mcg/kg/hour throughout operation.          PAC Discussion and Assessment    ASA Classification: 3  Case is suitable for: [place]  Anesthetic techniques and relevant risks discussed: GA  Invasive monitoring and risk discussed: No    Possibility and Risk of blood transfusion discussed: No            PAC Resident/NP Anesthesia Assessment: Anayeli Gagnon is a 74 year old female scheduled to undergo Posterior thoracic 12 to Lumbar 1 or additional level for placement of spinal cord stimulator paddle and placement of implantable pulse generator/battery over right buttock with Luis Orourke MD on 4/7/21 at [facility] for treatment of Spinal cord injury of thoracic region without bone injury, subsequent encounter (H); Thoracolumbar back pain; Neuropathic pain; Intractable neuropathic pain of lumbosacral origin; Incomplete injury of thoracic spinal cord in T1 to T6 region without bony injury (H); Edema of spinal cord (H); Chronic bilateral low back pain with bilateral sciatica; Arteriovenous fistula of spinal cord vessels.       Pt has had prior anesthetic.     No history of anesthetic complications     She has the following specific operative considerations:   # BRADY 1/8 = low risk  # VTE risk: 0.5%  # Risk of PONV score = 3.  If > 2, anti-emetic intervention recommended.  # Anesthesia considerations:  Refer to PAC assessment in anesthesia records    # Increased  risk of postoperative nausea/vomiting: Recommend use of antiemetic agents in the perioperative period.      # On chronic opioids. Taking MS Contin bid & Percocet 4-6 tabs daily. Refer to pharmacy note dated 3/31/21 for pain management recommendations.    CARDIAC: METS 1, wheelchair bound      # RCRI : High risk surgery.  0.9% risk of major adverse cardiac event.     #  Echo 2017:  EF 70-75%, normal function     #  Cath 2017: no CAD       PULMONARY:     # Never smoked    # Denies asthma or inhaler use    GI:     # Denies GERD    # Has colostomy    /RENAL:     # Has hudson catheter    ENDO: BMI 24    # No DM    ORTHO:     # Mildly limited ROM of neck    # Hx progressive paralysis and spinal cord injury due to an unclear etiology but involving spinal cord tethering and development of cord injury involving the mid to lower thoracic spinal cord.  She suffers from significant pain.     # Arteriovenous fistula of spinal cord vessels       #  Non-ambulatory, Consideration for safe lifting techniques.           Patient is optimized and is acceptable candidate for the proposed procedure. No further diagnostic evaluation is needed.    Final plan per anesthesiologist on day of surgery.     Reviewed and Signed by PAC Mid-Level Provider/Resident  Mid-Level Provider/Resident: Rosetta Briscoe PA-C  Date: 3/31/21  Time: 1752                    PAC Pharmacist Assessment: PREOPERATIVE PAIN CONSULT FOR POSTOPERATIVE PAIN MANAGEMENT  Anayeli KARYN Gagnon was interviewed via phone on March 31, 2021 prior to PAC Clinic appointment. Patient is preparing for the planned procedure with Dr. Orourke on 4/7/21 at the [facility] for Posterior thoracic 12 to Lumbar 1 or additional level for placement of spinal cord stimulator paddle and placement of implantable pulse generator/battery over right buttock.  These recommendations are intended for patients admitted to the hospital after a procedure and are only valid for 30  days from the date of service. If there are significant changes in opioid dosing between today and day of procedure the below recommendations may have to be adjusted.      RECOMMENDATIONS:   The following pain management recommendations are made based on information from today's visit and should not replace medical decision-making based on patient condition at the time of procedure or postoperatively.      - PREOPERATIVE:  + Long acting opioid - MS Contin 15 mg PO BID. Take usual dose on the morning of procedure.  +  Before surgery recommend gabapentin *- pt to take usual dose of 600 mg the morning of procedure  + Before procedure recommend acetaminophen 975 mg PO in pre-op (Written in pre-op by PAC MIKKI)    - INTRAOPERATIVE (Anesthesiologist/CRNA to consider):   + Regional anesthesia - Defer to RAPS team   + Ketamine IV intraoperatively  + Avoid remifentanil - to reduce risk of developing hyperalgesia    - POSTOPERATIVE INPATIENT MANAGEMENT:  Opioid analgesic:  + Note: if neuraxial opioid or other long acting opioid (eg. Methadone) is given during procedure contact on-call pain provider for adjustment in opioid plan  + Note if regional approach includes an opioid via the epidural then defer opioid management to RAPS team.   + If able to take oral medications (preferred):           -- restart MS Contin 15 mg PO BID  (hold or reduce dose as needed if patient has s/sx sedation or respiratory depression)          -- do not restart PTA oxycodone-APAP; instead start oxycodone 5-10 mg PO q4h PRN, low threshold to increase frequency to q3h PRN    + If unable to take oral medications:          --HYDROmorphone (Dilaudid) PCA recommended dose range 0.2-0.3 mg Q 10 min lockout interval with NO continuous rate. Start with 0.2 mg PCA dose Q 10 min lockout interval. If necessary for pain control and improvement in physical function, notify provider for PCA dose increase orders per recommended dose range. Hold or reduce dose for  sedation.          -- Hold other PO and IV opioids while on PCA    Nonopioid analgesics:   + Defer use of NSAID to surgeon  + Start acetaminophen 975 mg PO every 6 hr scheduled if no concern about masking fevers  + Continue gabapentin 600 mg PO every 8 hours scheduled  + Resume prior to admission medications related to pain: baclofen 10 mg PO TID spasms    Muscle Relaxant:   + baclofen as above  + If unable to tolerate PO meds, use Diazepam (Valium) 5 mg IV Q 6 hr PRN muscle spasm    Stool softeners/Laxatives:   + When appropriate start senna-docusate 1-2 tabs PO BID and Miralax 17 g daily to prevent opioid induced constipation.     Other:  + Recommend close monitoring of respiratory status postoperatively with capnography and continuous SpO2 monitoring. Would recommend continuing capnography beyond the usual 24 hr due to patient's opioid tolerance.       + Ketamine IV infusion.  If needed for acute postop uncontrolled pain, the primary service may decide to start ketamine infusion at 2.5 mg/hr (0.05 mg/kg/hr).  Ketamine for acute pain management will be used as an analgesic medication in addition to opioids when usual analgesics are suboptimal.Only start ketamine IV if patient is stable from a cardiovascular standpoint.  Low dose ketamine may be administered on a regular nursing unit according to [facility] ketamine-low dose policy.  Please see policy for details on contraindications, adverse effects and monitoring.  Of note, sialorrhea is another potential side effect and must weight benefit/risk if patient having trouble protecting airway.  [email or web address]     -------------------------------------------------------------------------------------------------------------------  - OUTPATIENT MEDICATIONS (related to pain management):  -- Long-acting opioid: MS Contin 15 mg PO BID  -- Short-acting opioid: oxycodone-APAP 5-325 mg 1 tab PO q4h  PRN (4-6 tabs/day)  -- Oral adjuvant(s): gabapentin 600 mg PO TID, baclofen 10 mg PO TID PRN spasms  -- Topicals: lidocaine patch, heat/ice packs, TENS unit    Verbal consent was given by patient to access pharmacy records and [place] Prescription Monitoring Profile: Yes  Outpatient opioids prescribed by [facility]  Outpatient opioid oral morphine equivalent (OME): 75    ASSESSMENT:    Anayeli Gagnon has a history of progressive paralysis and spinal cord injury of unclear etiology involving mid-lower thoracic region, resulting in refractory neuropathic pain, and is preparing for above mentioned surgery.      Today she described her pain as located in lower right back region, as well as bilateral legs and feet. She endorsed descriptors like dull, burning, tingling when asked about the quality of her pain, and indicated it is constant. She rated it as an 8 out of 10 on an average day, and occasionally it reaches a 10 out of 10 on her worst days. She noted that activity in general aggravates the pain. Her pain often affects her sleep. She finds some relief from a TENS unit, as well as heat and ice packs, and also lidocaine patches.     She denied major adverse effects related to her pain regimen with the exception of some nausea. She described feeling quite drowsy when she had tried two baclofen tablets (20 mg total dose) in addition to her Percocet and MS Contin regimen in the past, and now she prefers 10 mg.     She denied use of alcohol, illicit/recreation substances (although she is considering medical marijuana certification), abuse of other opioids, and tobacco use.    She did not recall that she has utilized a PCA in the past. She was screened for and appears to be a good candidate for low-dose ketamine infusion for analgesia, and she is open to this adjuvant post-operatively.    Please refer to the [facility] pharmacist note dated 1/25/2021 in Care Everywhere (recommend to search  "Epic for \"buprenorphine\") for details related to a trial of buprenorphine patch that did not go well. Annamarie did not provide further details today, just noting that she is no longer on that medication.      If pain control remains an issue please consult the inpatient pain management service for further recommendations for pain management.  If immediate assistance is needed please contact the pain service at the number below.     Marcos Treviño, Myles  PAC Pharmacist  [phone]   March 31, 2021  1:49 PM    If questions or concerns, please contact the Inpatient Pain Management Service:  Call [phone] after hours, weekends and holidays.   Page [phone] from 8 AM - 3 PM Mon - Fri.  Reviewed and Signed by PAC Pharmacist  Pharmacist: Marcos Treviño  Date: 3/31/21  Time: 2:34pm   Rosetta Briscoe PA-C  " No

## 2023-06-26 NOTE — ED PROVIDER NOTE - CLINICAL SUMMARY MEDICAL DECISION MAKING FREE TEXT BOX
52-year-old male with history of hypertension on amlodipine and lumbar spinal surgery presents for several episodes of bright red blood with diarrhea and abdominal cramping sensation since late night Saturday (6/24). Physical exam is unremarkable. Concern for EHEC vs diverticulitis vs new onset IBD. Plan for labs, fluids, tylenol, zofran, fluids, and +/- CT abdomen/pelvis. 52-year-old male with history of hypertension on amlodipine and lumbar spinal surgery presents for several episodes of bright red blood with diarrhea and abdominal cramping sensation since late night Saturday (6/24). Physical exam is unremarkable. Concern for EHEC vs diverticulitis vs new onset IBD. Plan for labs, fluids, tylenol, zofran, fluids, and +/- CT abdomen/pelvis.    MINERVA Luz MD: Agree with resident/ACP MDM, assessment and plan as above.

## 2023-06-26 NOTE — ED PROVIDER NOTE - PHYSICAL EXAMINATION
GENERAL: no acute distress, endomorphic body habitus  HEENT: atraumatic, normocephalic, vision grossly intact, EOMI, no conjunctivitis or discharge, hearing grossly intact, no nasal discharge or epistaxis, clear pharynx  CV: regular rate, normal rhythm, normal S1/S2, no murmurs/rubs, no cyanosis  PULM: normal work of breathing, clear breath sounds in b/l upper/lower lung fields, no crackles/rales/rhonchi/wheezing  GI: soft/non-tender/nondistended abdomen, no guarding or rebound tenderness, no palpable masses  : no CVA tenderness  NEURO: A&Ox4, follows commands, normal speech, no focal motor or sensory deficits  MSK: no joint tenderness/swelling/erythema, ranging all extremities with no appreciable loss of ROM  EXT: no peripheral edema, no calf tenderness, no redness or swelling  SKIN: warm, dry, and intact, no rashes  PSYCH: appropriate mood and affect  RECTAL: GENERAL: no acute distress, endomorphic body habitus  HEENT: atraumatic, normocephalic, vision grossly intact, EOMI, no conjunctivitis or discharge, hearing grossly intact, no nasal discharge or epistaxis, clear pharynx  CV: regular rate, normal rhythm, normal S1/S2, no murmurs/rubs, no cyanosis  PULM: normal work of breathing, clear breath sounds in b/l upper/lower lung fields, no crackles/rales/rhonchi/wheezing  GI: soft/non-tender/nondistended abdomen, no guarding or rebound tenderness, no palpable masses  : no CVA tenderness  NEURO: A&Ox4, follows commands, normal speech, no focal motor or sensory deficits  MSK: no joint tenderness/swelling/erythema, ranging all extremities with no appreciable loss of ROM  EXT: no peripheral edema, no calf tenderness, no redness or swelling  SKIN: warm, dry, and intact, no rashes  PSYCH: appropriate mood and affect  RECTAL: no externa masses or hemorrhoids, no bright red blood per rectum, no masses/hemorrhoids palpated internally, rectal tone intact

## 2023-10-23 ENCOUNTER — APPOINTMENT (OUTPATIENT)
Dept: FAMILY MEDICINE | Facility: CLINIC | Age: 53
End: 2023-10-23

## 2023-10-24 PROBLEM — I10 ESSENTIAL (PRIMARY) HYPERTENSION: Chronic | Status: ACTIVE | Noted: 2023-06-26

## 2023-10-30 ENCOUNTER — APPOINTMENT (OUTPATIENT)
Dept: FAMILY MEDICINE | Facility: CLINIC | Age: 53
End: 2023-10-30

## 2023-11-22 ENCOUNTER — APPOINTMENT (OUTPATIENT)
Dept: FAMILY MEDICINE | Facility: CLINIC | Age: 53
End: 2023-11-22

## 2024-01-18 ENCOUNTER — RX RENEWAL (OUTPATIENT)
Age: 54
End: 2024-01-18

## 2024-01-18 RX ORDER — AMLODIPINE BESYLATE 5 MG/1
5 TABLET ORAL DAILY
Qty: 90 | Refills: 3 | Status: ACTIVE | COMMUNITY
Start: 2018-10-16 | End: 1900-01-01

## 2024-03-27 ENCOUNTER — NON-APPOINTMENT (OUTPATIENT)
Age: 54
End: 2024-03-27

## 2024-03-27 ENCOUNTER — APPOINTMENT (OUTPATIENT)
Dept: FAMILY MEDICINE | Facility: CLINIC | Age: 54
End: 2024-03-27
Payer: COMMERCIAL

## 2024-03-27 VITALS
HEIGHT: 71 IN | BODY MASS INDEX: 25.9 KG/M2 | DIASTOLIC BLOOD PRESSURE: 82 MMHG | HEART RATE: 69 BPM | OXYGEN SATURATION: 97 % | TEMPERATURE: 99.1 F | SYSTOLIC BLOOD PRESSURE: 130 MMHG | RESPIRATION RATE: 14 BRPM | WEIGHT: 185 LBS

## 2024-03-27 DIAGNOSIS — Z00.00 ENCOUNTER FOR GENERAL ADULT MEDICAL EXAMINATION W/OUT ABNORMAL FINDINGS: ICD-10-CM

## 2024-03-27 DIAGNOSIS — C44.91 BASAL CELL CARCINOMA OF SKIN, UNSPECIFIED: ICD-10-CM

## 2024-03-27 DIAGNOSIS — R68.89 OTHER GENERAL SYMPTOMS AND SIGNS: ICD-10-CM

## 2024-03-27 DIAGNOSIS — Z13.29 ENCOUNTER FOR SCREENING FOR OTHER SUSPECTED ENDOCRINE DISORDER: ICD-10-CM

## 2024-03-27 DIAGNOSIS — R73.03 PREDIABETES.: ICD-10-CM

## 2024-03-27 PROCEDURE — 99396 PREV VISIT EST AGE 40-64: CPT

## 2024-03-27 PROCEDURE — 93000 ELECTROCARDIOGRAM COMPLETE: CPT

## 2024-03-27 NOTE — HEALTH RISK ASSESSMENT
[Employed] : employed [] :  [Former] : Former [Monthly or less (1 pt)] : Monthly or less (1 point) [5 or 6 (2 pts)] : 5 or 6 (2  points) [Less than monthly (1 pt)] : Less than monthly (1 point) [Yes] : In the past 12 months have you used drugs other than those required for medical reasons? Yes [0] : 2) Feeling down, depressed, or hopeless: Not at all (0) [PHQ-2 Negative - No further assessment needed] : PHQ-2 Negative - No further assessment needed [Patient reported colonoscopy was normal] : Patient reported colonoscopy was normal [# Of Children ___] : has [unfilled] children [Fully functional (bathing, dressing, toileting, transferring, walking, feeding)] : Fully functional (bathing, dressing, toileting, transferring, walking, feeding) [Fully functional (using the telephone, shopping, preparing meals, housekeeping, doing laundry, using] : Fully functional and needs no help or supervision to perform IADLs (using the telephone, shopping, preparing meals, housekeeping, doing laundry, using transportation, managing medications and managing finances) [< 15 Years] : < 15 Years [Audit-CScore] : 4 [de-identified] : theodore few times a week   [[id number]] : 0 [ColonoscopyDate] : 08/20 [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 30 years at least 1/2 pack

## 2024-03-27 NOTE — PLAN
[FreeTextEntry1] : #HCM -lab work script provided -depression screen negative -vaccinations:  received COVID vaccine                         up to date w/ TDAP                         influenza: did not have this past season, but usually gets  -up to date w/ colonoscopy - office to check w/ Dr. Benavidez to see when due next  -he follows w/ Derm for skin cancer screening -f/u CT lung cancer screening for history of smoking (quit 2021) -EKG NSR  -follow-up in 6 months

## 2024-03-27 NOTE — HISTORY OF PRESENT ILLNESS
[FreeTextEntry1] : CPE [de-identified] : 54 yo M PMH HTN, anxiety, IBS former smoker (quit 10/2021), laminectomy/fusion prediabetes, BCC presenting today for CPE. Denies any complaints today.  He never received the Xanax prescription last year because .25 dosage was not available at pharmacy, he would like a refill.   [place] woods often, would like to get tested for Lyme, asymptomatic and no known tick bite.

## 2024-03-27 NOTE — ASSESSMENT
[FreeTextEntry1] : 54 yo M PMH HTN, anxiety, IBS former smoker (quit 10/2021), laminectomy/fusion prediabetes, BCC presenting today for CPE.

## 2024-03-29 ENCOUNTER — LABORATORY RESULT (OUTPATIENT)
Age: 54
End: 2024-03-29

## 2024-04-02 ENCOUNTER — NON-APPOINTMENT (OUTPATIENT)
Age: 54
End: 2024-04-02

## 2024-04-02 ENCOUNTER — APPOINTMENT (OUTPATIENT)
Dept: THORACIC SURGERY | Facility: CLINIC | Age: 54
End: 2024-04-02
Payer: COMMERCIAL

## 2024-04-02 VITALS — HEIGHT: 71 IN | BODY MASS INDEX: 25.9 KG/M2 | WEIGHT: 185 LBS

## 2024-04-02 DIAGNOSIS — Z87.891 PERSONAL HISTORY OF NICOTINE DEPENDENCE: ICD-10-CM

## 2024-04-02 PROCEDURE — G0296 VISIT TO DETERM LDCT ELIG: CPT

## 2024-04-04 DIAGNOSIS — E78.5 HYPERLIPIDEMIA, UNSPECIFIED: ICD-10-CM

## 2024-04-04 LAB
ALBUMIN SERPL ELPH-MCNC: 4.5 G/DL
ALP BLD-CCNC: 86 U/L
ALT SERPL-CCNC: 48 U/L
ANION GAP SERPL CALC-SCNC: 11 MMOL/L
AST SERPL-CCNC: 29 U/L
BASOPHILS # BLD AUTO: 0.07 K/UL
BASOPHILS NFR BLD AUTO: 1.1 %
BILIRUB SERPL-MCNC: 0.2 MG/DL
BUN SERPL-MCNC: 18 MG/DL
CALCIUM SERPL-MCNC: 9 MG/DL
CHLORIDE SERPL-SCNC: 101 MMOL/L
CHOLEST SERPL-MCNC: 253 MG/DL
CO2 SERPL-SCNC: 24 MMOL/L
CREAT SERPL-MCNC: 1.25 MG/DL
EGFR: 70 ML/MIN/1.73M2
EOSINOPHIL # BLD AUTO: 0.14 K/UL
EOSINOPHIL NFR BLD AUTO: 2.1 %
ESTIMATED AVERAGE GLUCOSE: 114 MG/DL
GLUCOSE SERPL-MCNC: 93 MG/DL
HBA1C MFR BLD HPLC: 5.6 %
HCT VFR BLD CALC: 46.9 %
HDLC SERPL-MCNC: 27 MG/DL
HGB BLD-MCNC: 15.3 G/DL
IMM GRANULOCYTES NFR BLD AUTO: 0.6 %
LDLC SERPL CALC-MCNC: NORMAL MG/DL
LYMPHOCYTES # BLD AUTO: 2.08 K/UL
LYMPHOCYTES NFR BLD AUTO: 31.7 %
MAN DIFF?: NORMAL
MCHC RBC-ENTMCNC: 29.5 PG
MCHC RBC-ENTMCNC: 32.6 GM/DL
MCV RBC AUTO: 90.5 FL
MONOCYTES # BLD AUTO: 0.53 K/UL
MONOCYTES NFR BLD AUTO: 8.1 %
NEUTROPHILS # BLD AUTO: 3.71 K/UL
NEUTROPHILS NFR BLD AUTO: 56.4 %
NONHDLC SERPL-MCNC: 226 MG/DL
PLATELET # BLD AUTO: 276 K/UL
POTASSIUM SERPL-SCNC: 4.5 MMOL/L
PROT SERPL-MCNC: 7 G/DL
PSA FREE FLD-MCNC: 31 %
PSA FREE SERPL-MCNC: 0.5 NG/ML
PSA SERPL-MCNC: 1.61 NG/ML
RBC # BLD: 5.18 M/UL
RBC # FLD: 12.5 %
SODIUM SERPL-SCNC: 136 MMOL/L
TRIGL SERPL-MCNC: 1004 MG/DL
TSH SERPL-ACNC: 1.34 UIU/ML
WBC # FLD AUTO: 6.57 K/UL

## 2024-04-09 ENCOUNTER — APPOINTMENT (OUTPATIENT)
Dept: CT IMAGING | Facility: HOSPITAL | Age: 54
End: 2024-04-09
Payer: COMMERCIAL

## 2024-04-09 ENCOUNTER — OUTPATIENT (OUTPATIENT)
Dept: OUTPATIENT SERVICES | Facility: HOSPITAL | Age: 54
LOS: 1 days | End: 2024-04-09
Payer: COMMERCIAL

## 2024-04-09 DIAGNOSIS — Z98.890 OTHER SPECIFIED POSTPROCEDURAL STATES: Chronic | ICD-10-CM

## 2024-04-09 DIAGNOSIS — Z87.891 PERSONAL HISTORY OF NICOTINE DEPENDENCE: ICD-10-CM

## 2024-04-09 PROCEDURE — 71271 CT THORAX LUNG CANCER SCR C-: CPT | Mod: 26

## 2024-04-09 PROCEDURE — 71271 CT THORAX LUNG CANCER SCR C-: CPT

## 2024-04-11 LAB
ALBUMIN SERPL ELPH-MCNC: 4.5 G/DL
ALP BLD-CCNC: 70 U/L
ALT SERPL-CCNC: 45 U/L
ANION GAP SERPL CALC-SCNC: 11 MMOL/L
AST SERPL-CCNC: 22 U/L
BILIRUB SERPL-MCNC: 0.4 MG/DL
BUN SERPL-MCNC: 16 MG/DL
CALCIUM SERPL-MCNC: 9.3 MG/DL
CHLORIDE SERPL-SCNC: 106 MMOL/L
CHOLEST SERPL-MCNC: 219 MG/DL
CO2 SERPL-SCNC: 27 MMOL/L
CREAT SERPL-MCNC: 1.24 MG/DL
EGFR: 70 ML/MIN/1.73M2
GLUCOSE SERPL-MCNC: 102 MG/DL
HDLC SERPL-MCNC: 41 MG/DL
LDLC SERPL CALC-MCNC: 144 MG/DL
NONHDLC SERPL-MCNC: 177 MG/DL
POTASSIUM SERPL-SCNC: 5 MMOL/L
PROT SERPL-MCNC: 6.8 G/DL
SODIUM SERPL-SCNC: 144 MMOL/L
TRIGL SERPL-MCNC: 185 MG/DL

## 2024-04-19 DIAGNOSIS — R91.1 SOLITARY PULMONARY NODULE: ICD-10-CM

## 2024-05-15 PROBLEM — Z87.891 FORMER SMOKER: Status: ACTIVE | Noted: 2024-03-27

## 2024-05-15 NOTE — PLAN
[FreeTextEntry1] : Plan:   -Low Dose CT chest for lung cancer screening   -Patient to follow up with Dr. Tami John    -Encouraged continued smoking abstinence     Engaged in shared decision making with Mr. ODILON CONNORS. Answered all questions.He verbalized understanding and agreement.  He knows to call back with any questions or concerns.

## 2024-05-15 NOTE — HISTORY OF PRESENT ILLNESS
[TextBox_13] : Referred by Dr. danilo John   Mr. ODILON CONNORS is a 53 year old man with a history of nicotine dependence   Over the telephone today we reviewed and confirmed that the patient meets screening eligibility criteria:  -Age:53 years old     Smoking status: former    -Number of pack(s) per day: 1   -Number of years smoked: 35   -Number of pack years smokin    -Year Quit:      Mr. CONNORS denies any personal history of lung cancer. Denies any s/s of lung cancer. No lung cancer in a 1st degree relative. Denies any history of lung disease. Denies any history of occupational exposures.    [YearQuit] : 2021 [PacksperYear] : 35

## 2024-05-30 ENCOUNTER — APPOINTMENT (OUTPATIENT)
Dept: PULMONOLOGY | Facility: CLINIC | Age: 54
End: 2024-05-30
Payer: COMMERCIAL

## 2024-05-30 VITALS — HEIGHT: 71 IN | BODY MASS INDEX: 26.88 KG/M2 | WEIGHT: 192 LBS

## 2024-05-30 VITALS
HEART RATE: 65 BPM | RESPIRATION RATE: 16 BRPM | DIASTOLIC BLOOD PRESSURE: 88 MMHG | SYSTOLIC BLOOD PRESSURE: 130 MMHG | OXYGEN SATURATION: 98 %

## 2024-05-30 DIAGNOSIS — Z87.891 PERSONAL HISTORY OF NICOTINE DEPENDENCE: ICD-10-CM

## 2024-05-30 PROCEDURE — G2211 COMPLEX E/M VISIT ADD ON: CPT | Mod: NC

## 2024-05-30 PROCEDURE — 99203 OFFICE O/P NEW LOW 30 MIN: CPT

## 2024-05-30 RX ORDER — ALPRAZOLAM 0.25 MG/1
0.25 TABLET ORAL DAILY
Qty: 14 | Refills: 0 | Status: DISCONTINUED | COMMUNITY
Start: 2020-11-11 | End: 2024-05-30

## 2024-05-30 NOTE — HISTORY OF PRESENT ILLNESS
[Former] : former [Current] : current [TextBox_4] : 53M PMH former smoker, HTN who presents for initial pulmonary evaluation. Had lung CA screening 4/9/24 showing 3mm RML nodule. Denies SOB or cough. No fevers, no chills. He does smoke THC occasionally. No wheezing. No difficulty with physical exertion i.e. 2 flights of stairs. Pt works as a .  [TextBox_11] : 1 [TextBox_13] : 35 [YearQuit] : 2021

## 2024-05-30 NOTE — RESULTS/DATA
[[facility]] [id number]     EXAM:  CT LDCT LUNG CA SCREENING   ORDERED BY: CECY PHILLIPS  PROCEDURE DATE:  04/09/2024    INTERPRETATION:  Reason for Exam: Lung Cancer Screening exam. 35 pack year history of smoking. Quit smoking 2021.   Technique: CT chest without IV contrast performed. A low-dose noncontrast CT scan of the thorax was performed from the lung apices to the lung bases.  Sagittal and coronal reformatted images were obtained. 3D/MIP images obtained.  Comparison: CT abdomen and pelvis 6/26/2023  Findings:  Pulmonary nodules: Right middle lobe 0.3 cm solid perivascular nodule (series 5, image 104).  Central airways/ Lung parenchyma/Pleura :Unremarkable Chest wall /axilla:Unremarkable Mediastinum/hilum:Unremarkable Great vessels/Heart / pericardium: Unremarkable. Upper abdomen: Nonobstructing left renal calculus. Osseous structures: Unremarkable   Impression:  Right middle lobe 0.3 cm solid perivascular nodule.  Lung Rads Category :  2  Management : Low Dose CT scan in 12 months  --- End of Report ---      MOJGAN CAMARGO MD; Attending Radiologist This document has been electronically signed. Apr 17 2024 11:19AM  ~~~~~~~~~~~~~~~~~~~~~~~~~~~~~~~~~~~~~~~~~~~~~~~~~~~~~~~~~~~~~~~~~~~~~~~~

## 2024-05-30 NOTE — ASSESSMENT
[FreeTextEntry1] : 53M PMH former smoker, HTN who presents for initial pulmonary evaluation  - Denies significant pulmonary symptoms - 3mm RML nodule noted - Will repeat lung CA screening CT 04/2025 - Given smoking history will have him return for PFT  The patient expressed understanding and agreement with the plan as outlined above and accepts responsibility to be compliant with any recommended testing, treatment, and follow-up visits.  All relevant questions and concerns were addressed.  30 minutes of time were spent on the encounter. Medical records were reviewed, including but not limited to hospital records, outpatient records, laboratory data, and diagnostic imaging studies. Greater than 50% of the face-to-face encounter time was spent on counseling and/or coordination of care.  Les Otero MD, [facility] [place] [address] [place], [place] [address] T: [phone] F: [phone]

## 2024-08-05 ENCOUNTER — APPOINTMENT (OUTPATIENT)
Dept: PULMONOLOGY | Facility: CLINIC | Age: 54
End: 2024-08-05

## 2024-09-25 ENCOUNTER — APPOINTMENT (OUTPATIENT)
Dept: FAMILY MEDICINE | Facility: CLINIC | Age: 54
End: 2024-09-25
Payer: COMMERCIAL

## 2024-09-25 VITALS
HEART RATE: 72 BPM | TEMPERATURE: 97.8 F | WEIGHT: 185 LBS | HEIGHT: 71 IN | OXYGEN SATURATION: 99 % | SYSTOLIC BLOOD PRESSURE: 122 MMHG | RESPIRATION RATE: 16 BRPM | DIASTOLIC BLOOD PRESSURE: 84 MMHG | BODY MASS INDEX: 25.9 KG/M2

## 2024-09-25 DIAGNOSIS — Z09 ENCOUNTER FOR FOLLOW-UP EXAMINATION AFTER COMPLETED TREATMENT FOR CONDITIONS OTHER THAN MALIGNANT NEOPLASM: ICD-10-CM

## 2024-09-25 DIAGNOSIS — F41.9 ANXIETY DISORDER, UNSPECIFIED: ICD-10-CM

## 2024-09-25 DIAGNOSIS — I10 ESSENTIAL (PRIMARY) HYPERTENSION: ICD-10-CM

## 2024-09-25 PROCEDURE — 99214 OFFICE O/P EST MOD 30 MIN: CPT

## 2024-09-25 NOTE — ASSESSMENT
[FreeTextEntry1] : 54 yo M PMH HTN, anxiety, IBS former smoker (quit 10/2021), laminectomy/fusion prediabetes, BCC presenting today follow-up.

## 2024-09-25 NOTE — HISTORY OF PRESENT ILLNESS
[FreeTextEntry2] : 53 yo M PMH HTN, anxiety, IBS former smoker (quit 10/2021), laminectomy/fusion prediabetes, BCC presenting today for hospital follow-up.  [FreeTextEntry1] : follow-up   [de-identified] : 52 yo M PMH HTN, anxiety, IBS former smoker (quit 10/2021), laminectomy/fusion prediabetes, BCC presenting today follow-up.   Denies any complaints today.  He takes his medication as prescribed.

## 2024-09-25 NOTE — HISTORY OF PRESENT ILLNESS
[FreeTextEntry2] : 53 yo M PMH HTN, anxiety, IBS former smoker (quit 10/2021), laminectomy/fusion prediabetes, BCC presenting today for hospital follow-up.  [FreeTextEntry1] : follow-up   [de-identified] : 54 yo M PMH HTN, anxiety, IBS former smoker (quit 10/2021), laminectomy/fusion prediabetes, BCC presenting today follow-up.   Denies any complaints today.  He takes his medication as prescribed.

## 2024-09-25 NOTE — PLAN
[FreeTextEntry1] : # HTN  -controlled -continue current regimen of amlodipine 5 mg QD   #Situational Anxiety  -uses Xanax on PRN basis, ISTOP reviewed, .25 mg QD PRN refilled    #HCM  -influenza vaccine given today    -with labs for CPE will check Magnesium (long history of cramping, does not wish to see neuro at this time)

## 2025-01-16 ENCOUNTER — RX RENEWAL (OUTPATIENT)
Age: 55
End: 2025-01-16

## 2025-02-25 NOTE — ED PROCEDURE NOTE - PROCEDURE NAME, MLM
Point of Care Ultrasound Other
[No Acute Distress] : no acute distress
[Normal Oropharynx] : normal oropharynx
[Normal Appearance] : normal appearance
[Normal Rate/Rhythm] : normal rate/rhythm
Point of Care Ultrasound Renal
[Normal S1, S2] : normal s1, s2
[No Murmurs] : no murmurs
[No Abnormalities] : no abnormalities
[Benign] : benign
[No Edema] : no edema
[TextBox_68] : Inspiratory and expiratory wheezing

## 2025-03-31 ENCOUNTER — APPOINTMENT (OUTPATIENT)
Dept: FAMILY MEDICINE | Facility: CLINIC | Age: 55
End: 2025-03-31
Payer: COMMERCIAL

## 2025-03-31 ENCOUNTER — NON-APPOINTMENT (OUTPATIENT)
Age: 55
End: 2025-03-31

## 2025-03-31 VITALS
SYSTOLIC BLOOD PRESSURE: 112 MMHG | OXYGEN SATURATION: 98 % | BODY MASS INDEX: 25.9 KG/M2 | TEMPERATURE: 97.3 F | RESPIRATION RATE: 14 BRPM | HEART RATE: 73 BPM | WEIGHT: 185 LBS | HEIGHT: 71 IN | DIASTOLIC BLOOD PRESSURE: 88 MMHG

## 2025-03-31 DIAGNOSIS — Z01.83 ENCOUNTER FOR BLOOD TYPING: ICD-10-CM

## 2025-03-31 DIAGNOSIS — Z00.00 ENCOUNTER FOR GENERAL ADULT MEDICAL EXAMINATION W/OUT ABNORMAL FINDINGS: ICD-10-CM

## 2025-03-31 PROCEDURE — 99396 PREV VISIT EST AGE 40-64: CPT | Mod: 25

## 2025-03-31 PROCEDURE — 99213 OFFICE O/P EST LOW 20 MIN: CPT | Mod: 25

## 2025-03-31 RX ORDER — TESTOSTERONE CYPIONATE 100 MG/ML
100 INJECTION, SOLUTION INTRAMUSCULAR
Refills: 0 | Status: ACTIVE | COMMUNITY
Start: 2025-03-31

## 2025-04-09 LAB
ABORH: NORMAL
ALBUMIN SERPL ELPH-MCNC: 4.8 G/DL
ALP BLD-CCNC: 66 U/L
ALT SERPL-CCNC: 36 U/L
ANION GAP SERPL CALC-SCNC: 13 MMOL/L
APPEARANCE: CLEAR
AST SERPL-CCNC: 18 U/L
BACTERIA: NEGATIVE /HPF
BASOPHILS # BLD AUTO: 0.06 K/UL
BASOPHILS NFR BLD AUTO: 0.8 %
BILIRUB SERPL-MCNC: 0.6 MG/DL
BILIRUBIN URINE: NEGATIVE
BLOOD URINE: NEGATIVE
BUN SERPL-MCNC: 24 MG/DL
CALCIUM SERPL-MCNC: 9.6 MG/DL
CAST: 0 /LPF
CHLORIDE SERPL-SCNC: 101 MMOL/L
CHOLEST SERPL-MCNC: 216 MG/DL
CO2 SERPL-SCNC: 27 MMOL/L
COLOR: YELLOW
CREAT SERPL-MCNC: 1.38 MG/DL
EGFRCR SERPLBLD CKD-EPI 2021: 62 ML/MIN/1.73M2
EOSINOPHIL # BLD AUTO: 0.22 K/UL
EOSINOPHIL NFR BLD AUTO: 2.8 %
EPITHELIAL CELLS: 0 /HPF
ESTIMATED AVERAGE GLUCOSE: 126 MG/DL
GLUCOSE QUALITATIVE U: NEGATIVE MG/DL
GLUCOSE SERPL-MCNC: 103 MG/DL
HBA1C MFR BLD HPLC: 6 %
HCT VFR BLD CALC: 51.2 %
HDLC SERPL-MCNC: 40 MG/DL
HGB BLD-MCNC: 16.6 G/DL
IMM GRANULOCYTES NFR BLD AUTO: 0.4 %
KETONES URINE: NEGATIVE MG/DL
LDLC SERPL-MCNC: 149 MG/DL
LEUKOCYTE ESTERASE URINE: NEGATIVE
LYMPHOCYTES # BLD AUTO: 2.04 K/UL
LYMPHOCYTES NFR BLD AUTO: 26.1 %
MAN DIFF?: NORMAL
MCHC RBC-ENTMCNC: 28.6 PG
MCHC RBC-ENTMCNC: 32.4 G/DL
MCV RBC AUTO: 88.1 FL
MICROSCOPIC-UA: NORMAL
MONOCYTES # BLD AUTO: 0.76 K/UL
MONOCYTES NFR BLD AUTO: 9.7 %
NEUTROPHILS # BLD AUTO: 4.7 K/UL
NEUTROPHILS NFR BLD AUTO: 60.2 %
NITRITE URINE: NEGATIVE
NONHDLC SERPL-MCNC: 176 MG/DL
PH URINE: 6
PLATELET # BLD AUTO: 252 K/UL
POTASSIUM SERPL-SCNC: 5.2 MMOL/L
PROT SERPL-MCNC: 6.8 G/DL
PROTEIN URINE: NEGATIVE MG/DL
PSA FREE FLD-MCNC: 31 %
PSA FREE SERPL-MCNC: 0.6 NG/ML
PSA SERPL-MCNC: 1.95 NG/ML
RBC # BLD: 5.81 M/UL
RBC # FLD: 13.4 %
RED BLOOD CELLS URINE: 1 /HPF
SODIUM SERPL-SCNC: 141 MMOL/L
SPECIFIC GRAVITY URINE: 1.02
TRIGL SERPL-MCNC: 151 MG/DL
TSH SERPL-ACNC: 1.83 UIU/ML
UROBILINOGEN URINE: 0.2 MG/DL
WBC # FLD AUTO: 7.81 K/UL
WHITE BLOOD CELLS URINE: 0 /HPF

## 2025-04-14 RX ORDER — ATORVASTATIN CALCIUM 20 MG/1
20 TABLET, FILM COATED ORAL
Qty: 90 | Refills: 1 | Status: ACTIVE | COMMUNITY
Start: 2025-04-14 | End: 1900-01-01

## 2025-04-16 DIAGNOSIS — N18.9 CHRONIC KIDNEY DISEASE, UNSPECIFIED: ICD-10-CM

## 2025-04-16 LAB
ALBUMIN SERPL ELPH-MCNC: 4.2 G/DL
ALP BLD-CCNC: 62 U/L
ALT SERPL-CCNC: 27 U/L
ANION GAP SERPL CALC-SCNC: 10 MMOL/L
AST SERPL-CCNC: 22 U/L
BILIRUB SERPL-MCNC: 0.4 MG/DL
BUN SERPL-MCNC: 19 MG/DL
CALCIUM SERPL-MCNC: 8.8 MG/DL
CHLORIDE SERPL-SCNC: 101 MMOL/L
CO2 SERPL-SCNC: 27 MMOL/L
CREAT SERPL-MCNC: 1.42 MG/DL
EGFRCR SERPLBLD CKD-EPI 2021: 59 ML/MIN/1.73M2
GLUCOSE SERPL-MCNC: 97 MG/DL
POTASSIUM SERPL-SCNC: 4.4 MMOL/L
PROT SERPL-MCNC: 6.5 G/DL
SODIUM SERPL-SCNC: 138 MMOL/L

## 2025-04-28 ENCOUNTER — NON-APPOINTMENT (OUTPATIENT)
Age: 55
End: 2025-04-28

## 2025-04-28 VITALS — BODY MASS INDEX: 25.9 KG/M2 | WEIGHT: 185 LBS | HEIGHT: 71 IN

## 2025-04-28 DIAGNOSIS — Z87.891 PERSONAL HISTORY OF NICOTINE DEPENDENCE: ICD-10-CM

## 2025-04-29 ENCOUNTER — APPOINTMENT (OUTPATIENT)
Dept: CT IMAGING | Facility: HOSPITAL | Age: 55
End: 2025-04-29

## 2025-04-29 ENCOUNTER — OUTPATIENT (OUTPATIENT)
Dept: OUTPATIENT SERVICES | Facility: HOSPITAL | Age: 55
LOS: 1 days | End: 2025-04-29
Payer: COMMERCIAL

## 2025-04-29 DIAGNOSIS — Z98.890 OTHER SPECIFIED POSTPROCEDURAL STATES: Chronic | ICD-10-CM

## 2025-04-29 DIAGNOSIS — Z87.891 PERSONAL HISTORY OF NICOTINE DEPENDENCE: ICD-10-CM

## 2025-04-29 DIAGNOSIS — R91.1 SOLITARY PULMONARY NODULE: ICD-10-CM

## 2025-04-29 PROCEDURE — 71271 CT THORAX LUNG CANCER SCR C-: CPT | Mod: 26,59

## 2025-04-29 PROCEDURE — 71271 CT THORAX LUNG CANCER SCR C-: CPT

## 2025-05-28 ENCOUNTER — NON-APPOINTMENT (OUTPATIENT)
Age: 55
End: 2025-05-28

## 2025-06-02 ENCOUNTER — APPOINTMENT (OUTPATIENT)
Dept: NEPHROLOGY | Facility: CLINIC | Age: 55
End: 2025-06-02

## 2025-06-30 ENCOUNTER — APPOINTMENT (OUTPATIENT)
Dept: FAMILY MEDICINE | Facility: CLINIC | Age: 55
End: 2025-06-30
Payer: COMMERCIAL

## 2025-06-30 VITALS
WEIGHT: 193 LBS | BODY MASS INDEX: 27.02 KG/M2 | TEMPERATURE: 97.9 F | SYSTOLIC BLOOD PRESSURE: 112 MMHG | HEART RATE: 90 BPM | RESPIRATION RATE: 14 BRPM | DIASTOLIC BLOOD PRESSURE: 75 MMHG | HEIGHT: 71 IN | OXYGEN SATURATION: 98 %

## 2025-06-30 PROBLEM — R42 DIZZINESS: Status: ACTIVE | Noted: 2025-06-30

## 2025-06-30 PROBLEM — Z87.898 HISTORY OF DIZZINESS: Status: RESOLVED | Noted: 2022-10-20 | Resolved: 2025-06-30

## 2025-06-30 PROCEDURE — 99214 OFFICE O/P EST MOD 30 MIN: CPT

## 2025-07-02 PROBLEM — R74.8 ELEVATED LIVER ENZYMES: Status: ACTIVE | Noted: 2025-07-02

## 2025-07-02 LAB
ALBUMIN SERPL ELPH-MCNC: 4.4 G/DL
ALP BLD-CCNC: 68 U/L
ALT SERPL-CCNC: 51 U/L
ANION GAP SERPL CALC-SCNC: 14 MMOL/L
AST SERPL-CCNC: 29 U/L
BASOPHILS # BLD AUTO: 0.05 K/UL
BASOPHILS NFR BLD AUTO: 0.5 %
BILIRUB SERPL-MCNC: 0.5 MG/DL
BUN SERPL-MCNC: 14 MG/DL
CALCIUM SERPL-MCNC: 9.1 MG/DL
CHLORIDE SERPL-SCNC: 101 MMOL/L
CO2 SERPL-SCNC: 26 MMOL/L
CREAT SERPL-MCNC: 1.22 MG/DL
DEPRECATED D DIMER PPP IA-ACNC: <150 NG/ML DDU
EGFRCR SERPLBLD CKD-EPI 2021: 71 ML/MIN/1.73M2
EOSINOPHIL # BLD AUTO: 0.14 K/UL
EOSINOPHIL NFR BLD AUTO: 1.5 %
ESTIMATED AVERAGE GLUCOSE: 120 MG/DL
FERRITIN SERPL-MCNC: 143 NG/ML
GLUCOSE SERPL-MCNC: 76 MG/DL
HBA1C MFR BLD HPLC: 5.8 %
HCT VFR BLD CALC: 46.7 %
HGB BLD-MCNC: 15.2 G/DL
IMM GRANULOCYTES NFR BLD AUTO: 0.3 %
IRON SATN MFR SERPL: 16 %
IRON SERPL-MCNC: 58 UG/DL
LYMPHOCYTES # BLD AUTO: 2.29 K/UL
LYMPHOCYTES NFR BLD AUTO: 24.4 %
MAN DIFF?: NORMAL
MCHC RBC-ENTMCNC: 29.4 PG
MCHC RBC-ENTMCNC: 32.5 G/DL
MCV RBC AUTO: 90.3 FL
MONOCYTES # BLD AUTO: 0.88 K/UL
MONOCYTES NFR BLD AUTO: 9.4 %
NEUTROPHILS # BLD AUTO: 5.99 K/UL
NEUTROPHILS NFR BLD AUTO: 63.9 %
PLATELET # BLD AUTO: 241 K/UL
POTASSIUM SERPL-SCNC: 4.4 MMOL/L
PROT SERPL-MCNC: 6.7 G/DL
RBC # BLD: 5.17 M/UL
RBC # BLD: 5.17 M/UL
RBC # FLD: 13.6 %
RETICS # AUTO: 2 %
RETICS AGGREG/RBC NFR: 102.4 K/UL
SODIUM SERPL-SCNC: 141 MMOL/L
TIBC SERPL-MCNC: 363 UG/DL
UIBC SERPL-MCNC: 306 UG/DL
WBC # FLD AUTO: 9.38 K/UL

## 2025-07-09 ENCOUNTER — APPOINTMENT (OUTPATIENT)
Dept: FAMILY MEDICINE | Facility: CLINIC | Age: 55
End: 2025-07-09

## 2025-07-11 ENCOUNTER — RX RENEWAL (OUTPATIENT)
Age: 55
End: 2025-07-11

## 2025-07-21 ENCOUNTER — APPOINTMENT (OUTPATIENT)
Dept: ULTRASOUND IMAGING | Facility: HOSPITAL | Age: 55
End: 2025-07-21
Payer: COMMERCIAL

## 2025-07-21 ENCOUNTER — OUTPATIENT (OUTPATIENT)
Dept: OUTPATIENT SERVICES | Facility: HOSPITAL | Age: 55
LOS: 1 days | End: 2025-07-21
Payer: COMMERCIAL

## 2025-07-21 DIAGNOSIS — R74.8 ABNORMAL LEVELS OF OTHER SERUM ENZYMES: ICD-10-CM

## 2025-07-21 DIAGNOSIS — Z98.890 OTHER SPECIFIED POSTPROCEDURAL STATES: Chronic | ICD-10-CM

## 2025-07-21 PROCEDURE — 76705 ECHO EXAM OF ABDOMEN: CPT

## 2025-07-21 PROCEDURE — 76705 ECHO EXAM OF ABDOMEN: CPT | Mod: 26

## 2025-07-24 DIAGNOSIS — K76.0 FATTY (CHANGE OF) LIVER, NOT ELSEWHERE CLASSIFIED: ICD-10-CM

## 2025-08-18 ENCOUNTER — APPOINTMENT (OUTPATIENT)
Dept: GASTROENTEROLOGY | Facility: CLINIC | Age: 55
End: 2025-08-18